# Patient Record
Sex: MALE | Race: WHITE | NOT HISPANIC OR LATINO | Employment: FULL TIME | URBAN - METROPOLITAN AREA
[De-identification: names, ages, dates, MRNs, and addresses within clinical notes are randomized per-mention and may not be internally consistent; named-entity substitution may affect disease eponyms.]

---

## 2023-05-15 DIAGNOSIS — I10 PRIMARY HYPERTENSION: Primary | ICD-10-CM

## 2023-05-15 RX ORDER — ATORVASTATIN CALCIUM 10 MG/1
1 TABLET, FILM COATED ORAL DAILY
COMMUNITY
Start: 2023-05-01

## 2023-05-15 RX ORDER — DUPILUMAB 300 MG/2ML
INJECTION, SOLUTION SUBCUTANEOUS
COMMUNITY
Start: 2023-04-20

## 2023-05-15 RX ORDER — AMLODIPINE BESYLATE 5 MG/1
1 TABLET ORAL DAILY
COMMUNITY
Start: 2023-02-17 | End: 2023-05-15 | Stop reason: SDUPTHER

## 2023-05-15 RX ORDER — AMLODIPINE BESYLATE 5 MG/1
TABLET ORAL
Qty: 90 TABLET | Refills: 0 | Status: SHIPPED | OUTPATIENT
Start: 2023-05-15

## 2023-05-15 NOTE — TELEPHONE ENCOUNTER
Rx Refill Note  Requested Prescriptions     Pending Prescriptions Disp Refills   • amLODIPine (NORVASC) 5 MG tablet [Pharmacy Med Name: AMLODIPINE BESYLATE 5MG TABLETS] 90 tablet      Sig: TAKE 1 TABLET BY MOUTH EVERY DAY      Last office visit with prescribing clinician: 10/20/2022  Last labs: 10/22/2022  (Info found on [facility])     Next office visit with prescribing clinician: Visit date not found       Alma Dykes MA  05/15/23, 13:16 EDT

## 2023-07-28 RX ORDER — ATORVASTATIN CALCIUM 10 MG/1
TABLET, FILM COATED ORAL
Qty: 90 TABLET | Refills: 0 | Status: SHIPPED | OUTPATIENT
Start: 2023-07-28

## 2023-07-28 NOTE — TELEPHONE ENCOUNTER
Rx Refill Note  Requested Prescriptions     Pending Prescriptions Disp Refills    atorvastatin (LIPITOR) 10 MG tablet [Pharmacy Med Name: ATORVASTATIN 10MG TABLETS] 90 tablet 0     Sig: TAKE 1 TABLET BY MOUTH EVERY DAY      Last office visit with prescribing clinician: Visit date not found   Last telemedicine visit with prescribing clinician: Visit date not found   Next office visit with prescribing clinician: 9/8/2023                         Would you like a call back once the refill request has been completed: [] Yes [] No    If the office needs to give you a call back, can they leave a voicemail: [] Yes [] No    Prabha Disla MA  07/28/23, 11:08 EDT

## 2023-08-04 DIAGNOSIS — I10 PRIMARY HYPERTENSION: ICD-10-CM

## 2023-08-04 RX ORDER — AMLODIPINE BESYLATE 5 MG/1
TABLET ORAL
Qty: 90 TABLET | Refills: 0 | Status: SHIPPED | OUTPATIENT
Start: 2023-08-04

## 2023-09-08 ENCOUNTER — OFFICE VISIT (OUTPATIENT)
Dept: FAMILY MEDICINE CLINIC | Facility: CLINIC | Age: 58
End: 2023-09-08
Payer: COMMERCIAL

## 2023-09-08 VITALS
HEART RATE: 83 BPM | BODY MASS INDEX: 29.35 KG/M2 | SYSTOLIC BLOOD PRESSURE: 135 MMHG | HEIGHT: 67 IN | OXYGEN SATURATION: 98 % | WEIGHT: 187 LBS | DIASTOLIC BLOOD PRESSURE: 85 MMHG

## 2023-09-08 DIAGNOSIS — Z12.5 SCREENING PSA (PROSTATE SPECIFIC ANTIGEN): ICD-10-CM

## 2023-09-08 DIAGNOSIS — E66.3 OVERWEIGHT (BMI 25.0-29.9): ICD-10-CM

## 2023-09-08 DIAGNOSIS — D52.0 DIETARY FOLATE DEFICIENCY ANEMIA: ICD-10-CM

## 2023-09-08 DIAGNOSIS — E78.2 MIXED HYPERLIPIDEMIA: ICD-10-CM

## 2023-09-08 DIAGNOSIS — I10 ESSENTIAL HYPERTENSION: Primary | ICD-10-CM

## 2023-09-08 DIAGNOSIS — Z11.59 ENCOUNTER FOR HEPATITIS C SCREENING TEST FOR LOW RISK PATIENT: ICD-10-CM

## 2023-09-08 DIAGNOSIS — E55.9 VITAMIN D DEFICIENCY: ICD-10-CM

## 2023-09-08 DIAGNOSIS — E53.9 VITAMIN B DEFICIENCY: ICD-10-CM

## 2023-09-08 PROBLEM — Z86.010 HISTORY OF COLON POLYPS: Status: ACTIVE | Noted: 2020-07-17

## 2023-09-08 PROBLEM — Z86.0100 HISTORY OF COLON POLYPS: Status: ACTIVE | Noted: 2020-07-17

## 2023-09-08 PROBLEM — Z12.11 ENCOUNTER FOR SCREENING FOR MALIGNANT NEOPLASM OF COLON: Status: ACTIVE | Noted: 2017-05-15

## 2023-09-08 PROBLEM — E53.8 FOLATE DEFICIENCY: Status: ACTIVE | Noted: 2023-09-08

## 2023-09-08 PROBLEM — F10.10 ALCOHOL ABUSE: Status: ACTIVE | Noted: 2022-10-20

## 2023-09-08 NOTE — PROGRESS NOTES
"Subjective     Cruz Lo is a 58 y.o. male who presents with   Chief Complaint   Patient presents with    Hyperlipidemia    Hypertension       Hyperlipidemia    Hypertension       Hypertension on amlodipine and hyperlipidemia on atorvastatin here for follow up.  No issues.   Weaning off alcohol drinks once a week or less.  Still uses some tobacco.  Has the usual stressors of everyday life. Trying to live his life better.   Last visit had folate deficiency anemia.      The following data was reviewed by: Jeannine Wong MD on 09/08/2023:    Data reviewed : labs from last visits      Review of Systems     Objective     /85   Pulse 83   Ht 168.9 cm (66.5\")   Wt 84.8 kg (187 lb)   SpO2 98%   BMI 29.73 kg/m²     Physical Exam  Constitutional:       Appearance: Normal appearance.   Cardiovascular:      Rate and Rhythm: Normal rate and regular rhythm.      Heart sounds: Normal heart sounds.   Pulmonary:      Effort: Pulmonary effort is normal.      Breath sounds: Normal breath sounds.   Neurological:      Mental Status: He is alert.   Psychiatric:         Behavior: Behavior normal.       Procedures     Assessment & Plan   Diagnoses and all orders for this visit:    1. Essential hypertension (Primary)    2. Mixed hyperlipidemia    3. Screening PSA (prostate specific antigen)    4. Encounter for hepatitis C screening test for low risk patient    5. Dietary folate deficiency anemia    6. Vitamin B deficiency    7. Vitamin D deficiency    8. Overweight (BMI 25.0-29.9)  Assessment & Plan:  Patient's (Body mass index is 29.73 kg/m².) indicates that they are overweight with health conditions that include hypertension . Weight is improving with lifestyle modifications. BMI is is above average; BMI management plan is completed. We discussed  reducing alcohol .            Discussion    Patient Instructions   I have ordered lab tests today.  You should receive a phone call or a King[employer] message with those " results.  If you have not heard from us in 7-10 days, please call the office.      Follow through with your desire to try acupuncture but we also discussed trying daily Naltrexone to try to help with the alcohol use.  Just let me know.               Jeannine Wong MD

## 2023-09-08 NOTE — ASSESSMENT & PLAN NOTE
Patient's (Body mass index is 29.73 kg/m².) indicates that they are overweight with health conditions that include hypertension . Weight is improving with lifestyle modifications. BMI is is above average; BMI management plan is completed. We discussed  reducing alcohol .

## 2023-09-08 NOTE — PATIENT INSTRUCTIONS
I have ordered lab tests today.  You should receive a phone call or a Ac[employer]t message with those results.  If you have not heard from us in 7-10 days, please call the office.      Follow through with your desire to try acupuncture but we also discussed trying daily Naltrexone to try to help with the alcohol use.  Just let me know.

## 2023-09-09 LAB
25(OH)D3+25(OH)D2 SERPL-MCNC: 38.3 NG/ML (ref 30–100)
ALBUMIN SERPL-MCNC: 4.3 G/DL (ref 3.8–4.9)
ALBUMIN/GLOB SERPL: 1.9 {RATIO} (ref 1.2–2.2)
ALP SERPL-CCNC: 56 IU/L (ref 44–121)
ALT SERPL-CCNC: 17 IU/L (ref 0–44)
AST SERPL-CCNC: 20 IU/L (ref 0–40)
BASOPHILS # BLD AUTO: 0.1 X10E3/UL (ref 0–0.2)
BASOPHILS NFR BLD AUTO: 1 %
BILIRUB SERPL-MCNC: <0.2 MG/DL (ref 0–1.2)
BUN SERPL-MCNC: 17 MG/DL (ref 6–24)
BUN/CREAT SERPL: 12 (ref 9–20)
CALCIUM SERPL-MCNC: 9.3 MG/DL (ref 8.7–10.2)
CHLORIDE SERPL-SCNC: 109 MMOL/L (ref 96–106)
CHOLEST SERPL-MCNC: 161 MG/DL (ref 100–199)
CO2 SERPL-SCNC: 21 MMOL/L (ref 20–29)
CREAT SERPL-MCNC: 1.45 MG/DL (ref 0.76–1.27)
EGFRCR SERPLBLD CKD-EPI 2021: 56 ML/MIN/1.73
EOSINOPHIL # BLD AUTO: 0.1 X10E3/UL (ref 0–0.4)
EOSINOPHIL NFR BLD AUTO: 2 %
ERYTHROCYTE [DISTWIDTH] IN BLOOD BY AUTOMATED COUNT: 13.1 % (ref 11.6–15.4)
FOLATE SERPL-MCNC: 13.2 NG/ML
GLOBULIN SER CALC-MCNC: 2.3 G/DL (ref 1.5–4.5)
GLUCOSE SERPL-MCNC: 93 MG/DL (ref 70–99)
HCT VFR BLD AUTO: 34.4 % (ref 37.5–51)
HCV IGG SERPL QL IA: NON REACTIVE
HDLC SERPL-MCNC: 65 MG/DL
HGB BLD-MCNC: 11.8 G/DL (ref 13–17.7)
IMM GRANULOCYTES # BLD AUTO: 0 X10E3/UL (ref 0–0.1)
IMM GRANULOCYTES NFR BLD AUTO: 0 %
LDLC SERPL CALC-MCNC: 83 MG/DL (ref 0–99)
LYMPHOCYTES # BLD AUTO: 1.5 X10E3/UL (ref 0.7–3.1)
LYMPHOCYTES NFR BLD AUTO: 21 %
MCH RBC QN AUTO: 31.4 PG (ref 26.6–33)
MCHC RBC AUTO-ENTMCNC: 34.3 G/DL (ref 31.5–35.7)
MCV RBC AUTO: 92 FL (ref 79–97)
MONOCYTES # BLD AUTO: 0.6 X10E3/UL (ref 0.1–0.9)
MONOCYTES NFR BLD AUTO: 9 %
NEUTROPHILS # BLD AUTO: 4.6 X10E3/UL (ref 1.4–7)
NEUTROPHILS NFR BLD AUTO: 67 %
PLATELET # BLD AUTO: 258 X10E3/UL (ref 150–450)
POTASSIUM SERPL-SCNC: 4.8 MMOL/L (ref 3.5–5.2)
PROT SERPL-MCNC: 6.6 G/DL (ref 6–8.5)
RBC # BLD AUTO: 3.76 X10E6/UL (ref 4.14–5.8)
SODIUM SERPL-SCNC: 146 MMOL/L (ref 134–144)
TRIGL SERPL-MCNC: 66 MG/DL (ref 0–149)
VIT B12 SERPL-MCNC: 1010 PG/ML (ref 232–1245)
VLDLC SERPL CALC-MCNC: 13 MG/DL (ref 5–40)
WBC # BLD AUTO: 6.9 X10E3/UL (ref 3.4–10.8)

## 2023-10-25 DIAGNOSIS — I10 PRIMARY HYPERTENSION: ICD-10-CM

## 2023-10-25 RX ORDER — ATORVASTATIN CALCIUM 10 MG/1
TABLET, FILM COATED ORAL
Qty: 90 TABLET | Refills: 0 | Status: SHIPPED | OUTPATIENT
Start: 2023-10-25

## 2023-10-25 RX ORDER — AMLODIPINE BESYLATE 5 MG/1
TABLET ORAL
Qty: 90 TABLET | Refills: 0 | Status: SHIPPED | OUTPATIENT
Start: 2023-10-25

## 2024-01-08 ENCOUNTER — TELEPHONE (OUTPATIENT)
Dept: FAMILY MEDICINE CLINIC | Facility: CLINIC | Age: 59
End: 2024-01-08
Payer: COMMERCIAL

## 2024-01-08 NOTE — TELEPHONE ENCOUNTER
Pt said he'll try the topical 1st since he can't do oral medications. Please send to [place] in chart

## 2024-01-08 NOTE — TELEPHONE ENCOUNTER
There are 2 options for treating toenail fungus.  1.  There is an oral medication To take for 3 months and then the toenails grow out over the next 3 to 6 months.  2. There is a topical treatment that she have to put on every day and it can take up to a year.  Does he have a preference?

## 2024-01-08 NOTE — TELEPHONE ENCOUNTER
Caller: Cruz Lo    Relationship: Self    Best call back number: [phone]     What medication are you requesting:      What are your current symptoms: TOE NAIL FUNGUS ON LEFT FOOT, ONE TOE HAS IT AND RIGHT FOOT HAS ONE TOE STARTING    How long have you been experiencing symptoms:      Have you had these symptoms before:    [] Yes  [] No    Have you been treated for these symptoms before:   [] Yes  [] No    If a prescription is needed, what is your preferred pharmacy and phone number: [id number] [address] AT [facility] - [phone]  - [phone]      Additional notes: PATIENT CALLED AND WANTS TO KNOW IF DR CORTEZ CAN CALL SOMETHING IN FOR TOE NAIL FUNGUS.  HE WENT TO [facility] FOR OVER THE COUNTER MEDICATION BUT NONE OF THEM TREAT THE TOE NAIL FUNGUS.

## 2024-01-23 DIAGNOSIS — I10 PRIMARY HYPERTENSION: ICD-10-CM

## 2024-01-23 RX ORDER — AMLODIPINE BESYLATE 5 MG/1
TABLET ORAL
Qty: 90 TABLET | Refills: 0 | Status: SHIPPED | OUTPATIENT
Start: 2024-01-23

## 2024-01-23 RX ORDER — ATORVASTATIN CALCIUM 10 MG/1
TABLET, FILM COATED ORAL
Qty: 90 TABLET | Refills: 0 | Status: SHIPPED | OUTPATIENT
Start: 2024-01-23

## 2024-03-08 ENCOUNTER — OFFICE VISIT (OUTPATIENT)
Dept: FAMILY MEDICINE CLINIC | Facility: CLINIC | Age: 59
End: 2024-03-08
Payer: COMMERCIAL

## 2024-03-08 VITALS
OXYGEN SATURATION: 98 % | BODY MASS INDEX: 29.29 KG/M2 | RESPIRATION RATE: 16 BRPM | DIASTOLIC BLOOD PRESSURE: 72 MMHG | HEIGHT: 67 IN | HEART RATE: 70 BPM | SYSTOLIC BLOOD PRESSURE: 138 MMHG | WEIGHT: 186.6 LBS

## 2024-03-08 DIAGNOSIS — D64.9 MILD ANEMIA: ICD-10-CM

## 2024-03-08 DIAGNOSIS — R35.0 URINARY FREQUENCY: ICD-10-CM

## 2024-03-08 DIAGNOSIS — D52.0 DIETARY FOLATE DEFICIENCY ANEMIA: ICD-10-CM

## 2024-03-08 DIAGNOSIS — N28.9 RENAL INSUFFICIENCY: ICD-10-CM

## 2024-03-08 DIAGNOSIS — M79.642 PAIN IN BOTH HANDS: ICD-10-CM

## 2024-03-08 DIAGNOSIS — I10 ESSENTIAL HYPERTENSION: Primary | ICD-10-CM

## 2024-03-08 DIAGNOSIS — Z12.5 SCREENING PSA (PROSTATE SPECIFIC ANTIGEN): ICD-10-CM

## 2024-03-08 DIAGNOSIS — E55.9 VITAMIN D DEFICIENCY: ICD-10-CM

## 2024-03-08 DIAGNOSIS — M79.641 PAIN IN BOTH HANDS: ICD-10-CM

## 2024-03-08 DIAGNOSIS — E78.2 MIXED HYPERLIPIDEMIA: ICD-10-CM

## 2024-03-08 DIAGNOSIS — R53.83 FATIGUE, UNSPECIFIED TYPE: ICD-10-CM

## 2024-03-08 DIAGNOSIS — L20.84 INTRINSIC ECZEMA: ICD-10-CM

## 2024-03-08 DIAGNOSIS — E53.9 VITAMIN B DEFICIENCY: ICD-10-CM

## 2024-03-08 PROBLEM — M25.511 PAIN OF BOTH SHOULDER JOINTS: Status: ACTIVE | Noted: 2024-03-08

## 2024-03-08 PROBLEM — M25.512 PAIN OF BOTH SHOULDER JOINTS: Status: ACTIVE | Noted: 2024-03-08

## 2024-03-08 PROCEDURE — 99214 OFFICE O/P EST MOD 30 MIN: CPT | Performed by: FAMILY MEDICINE

## 2024-03-08 NOTE — PATIENT INSTRUCTIONS
I have ordered lab tests today.  You should receive a phone call or a [employer]ine [employer]t message with those results.  If you have not heard from us in 7-10 days, please call the office.      Using advil a few times a month is reasonable.    We could get a hand specialist if we need it.     Continue B12 and D since those are helpful.  Use the sublingual. We can consider shots if your levels are not at goal.     Regular exercise is helpful for energy.

## 2024-03-08 NOTE — PROGRESS NOTES
"Subjective     Cruz Lo is a 59 y.o. male who presents with   Chief Complaint   Patient presents with    Hypertension    Hyperlipidemia       Hypertension    Hyperlipidemia         Hypertension on amlodipine 5mg.  He doesn't monitor it.  He's had more stress than usual lately and didn't get to take a winter break.   He's on atorvastatin for cholesterol.     He recently turned 59.  He felt \"wiped out\" for a couple weeks.  Vision seemed blurred with driving in the dark. He started back on B12 and D3 and that seemed to help.  He doesn't eat great but tries.  He does take a multivitamin half the time.  Drinks Ensure at times.  He stopped alcohol in November.  He does wake twice a night to void.  He feels achy sometimes in the morning.  Feels it in his bones, not muscles and his hands hurt.   He takes Advil on bad days and that helps.  His hands and shoulders will ache.              Review of Systems     Objective     /72   Pulse 70   Resp 16   Ht 168.9 cm (66.5\")   Wt 84.6 kg (186 lb 9.6 oz)   SpO2 98%   BMI 29.67 kg/m²     Physical Exam  Constitutional:       Appearance: Normal appearance.   Cardiovascular:      Rate and Rhythm: Normal rate and regular rhythm.      Heart sounds: Normal heart sounds.   Pulmonary:      Effort: Pulmonary effort is normal.      Breath sounds: Normal breath sounds.   Neurological:      Mental Status: He is alert.   Psychiatric:         Behavior: Behavior normal.         Procedures     Assessment & Plan   Diagnoses and all orders for this visit:    1. Essential hypertension (Primary)  Assessment & Plan:  Controlled amlodipine.       2. Mixed hyperlipidemia  -     Comprehensive Metabolic Panel  -     Lipid Panel    3. Fatigue, unspecified type  -     TSH    4. Vitamin B deficiency  -     Vitamin B12    5. Vitamin D deficiency  -     Vitamin D,25-Hydroxy    6. Dietary folate deficiency anemia  -     CBC & Differential  -     Folate    7. Urinary frequency    8. Screening PSA " (prostate specific antigen)  -     PSA Screen    9. Pain in both hands  -     C-reactive Protein  -     Cyclic Citrul Peptide Antibody, IgG / IgA  -     Rheumatoid Factor  -     Sedimentation Rate  -     CK    10. Intrinsic eczema  Assessment & Plan:  On Dupixent with help           Discussion    Patient Instructions   I have ordered lab tests today.  You should receive a phone call or a App[employer]ud message with those results.  If you have not heard from us in 7-10 days, please call the office.      Using advil a few times a month is reasonable.    We could get a hand specialist if we need it.     Continue B12 and D since those are helpful.  Use the sublingual. We can consider shots if your levels are not at goal.     Regular exercise is helpful for energy.               Jeannine Wong MD

## 2024-03-09 LAB
25(OH)D3+25(OH)D2 SERPL-MCNC: 29 NG/ML (ref 30–100)
ALBUMIN SERPL-MCNC: 4.4 G/DL (ref 3.5–5.2)
ALBUMIN/GLOB SERPL: 2.2 G/DL
ALP SERPL-CCNC: 54 U/L (ref 39–117)
ALT SERPL-CCNC: 22 U/L (ref 1–41)
AST SERPL-CCNC: 18 U/L (ref 1–40)
BASOPHILS # BLD AUTO: 0.05 10*3/MM3 (ref 0–0.2)
BASOPHILS NFR BLD AUTO: 0.7 % (ref 0–1.5)
BILIRUB SERPL-MCNC: 0.4 MG/DL (ref 0–1.2)
BUN SERPL-MCNC: 22 MG/DL (ref 8–23)
BUN/CREAT SERPL: 14.2 (ref 7–25)
CALCIUM SERPL-MCNC: 9.6 MG/DL (ref 8.6–10.5)
CCP IGA+IGG SERPL IA-ACNC: 3 UNITS (ref 0–19)
CHLORIDE SERPL-SCNC: 110 MMOL/L (ref 98–107)
CHOLEST SERPL-MCNC: 169 MG/DL (ref 0–200)
CK SERPL-CCNC: 92 U/L (ref 20–200)
CO2 SERPL-SCNC: 23.1 MMOL/L (ref 22–29)
CREAT SERPL-MCNC: 1.55 MG/DL (ref 0.76–1.27)
CRP SERPL-MCNC: <0.3 MG/DL (ref 0–0.5)
EGFRCR SERPLBLD CKD-EPI 2021: 50.9 ML/MIN/1.73
EOSINOPHIL # BLD AUTO: 0.07 10*3/MM3 (ref 0–0.4)
EOSINOPHIL NFR BLD AUTO: 0.9 % (ref 0.3–6.2)
ERYTHROCYTE [DISTWIDTH] IN BLOOD BY AUTOMATED COUNT: 13 % (ref 12.3–15.4)
ERYTHROCYTE [SEDIMENTATION RATE] IN BLOOD BY WESTERGREN METHOD: 7 MM/HR (ref 0–20)
FOLATE SERPL-MCNC: >20 NG/ML (ref 4.78–24.2)
GLOBULIN SER CALC-MCNC: 2 GM/DL
GLUCOSE SERPL-MCNC: 86 MG/DL (ref 65–99)
HCT VFR BLD AUTO: 34.5 % (ref 37.5–51)
HDLC SERPL-MCNC: 62 MG/DL (ref 40–60)
HGB BLD-MCNC: 11.3 G/DL (ref 13–17.7)
IMM GRANULOCYTES # BLD AUTO: 0.04 10*3/MM3 (ref 0–0.05)
IMM GRANULOCYTES NFR BLD AUTO: 0.5 % (ref 0–0.5)
LDLC SERPL CALC-MCNC: 86 MG/DL (ref 0–100)
LYMPHOCYTES # BLD AUTO: 1.32 10*3/MM3 (ref 0.7–3.1)
LYMPHOCYTES NFR BLD AUTO: 17.8 % (ref 19.6–45.3)
MCH RBC QN AUTO: 30.4 PG (ref 26.6–33)
MCHC RBC AUTO-ENTMCNC: 32.8 G/DL (ref 31.5–35.7)
MCV RBC AUTO: 92.7 FL (ref 79–97)
MONOCYTES # BLD AUTO: 0.61 10*3/MM3 (ref 0.1–0.9)
MONOCYTES NFR BLD AUTO: 8.2 % (ref 5–12)
NEUTROPHILS # BLD AUTO: 5.33 10*3/MM3 (ref 1.7–7)
NEUTROPHILS NFR BLD AUTO: 71.9 % (ref 42.7–76)
NRBC BLD AUTO-RTO: 0 /100 WBC (ref 0–0.2)
PLATELET # BLD AUTO: 250 10*3/MM3 (ref 140–450)
POTASSIUM SERPL-SCNC: 5.5 MMOL/L (ref 3.5–5.2)
PROT SERPL-MCNC: 6.4 G/DL (ref 6–8.5)
PSA SERPL-MCNC: 3.3 NG/ML (ref 0–4)
RBC # BLD AUTO: 3.72 10*6/MM3 (ref 4.14–5.8)
RHEUMATOID FACT SERPL-ACNC: <10 IU/ML
SODIUM SERPL-SCNC: 144 MMOL/L (ref 136–145)
TRIGL SERPL-MCNC: 118 MG/DL (ref 0–150)
TSH SERPL DL<=0.005 MIU/L-ACNC: 3.35 UIU/ML (ref 0.27–4.2)
VIT B12 SERPL-MCNC: 854 PG/ML (ref 211–946)
VLDLC SERPL CALC-MCNC: 21 MG/DL (ref 5–40)
WBC # BLD AUTO: 7.42 10*3/MM3 (ref 3.4–10.8)

## 2024-03-11 ENCOUNTER — TELEPHONE (OUTPATIENT)
Dept: FAMILY MEDICINE CLINIC | Facility: CLINIC | Age: 59
End: 2024-03-11
Payer: COMMERCIAL

## 2024-03-11 LAB
IRON SERPL-MCNC: 94 MCG/DL (ref 59–158)
WRITTEN AUTHORIZATION: NORMAL

## 2024-03-11 NOTE — TELEPHONE ENCOUNTER
Caller: Cruz Lo    Relationship: Self    Best call back number: [phone]     What test was performed: BLOOD WORK    When was the test performed: 03-    Additional notes: PATIENT STATED HE HAS RECEIVED HIS LAB RESULTS IN [facility].    PATIENT IS REQUESTING TO KNOW EXACTLY WHAT SUPPLEMENTS AND HOW MUCH OF THESE SUPPLEMENTS HE IS TO BE TAKING.    PLEASE CALL TO DISCUSS AND ADVISE.

## 2024-03-21 ENCOUNTER — HOSPITAL ENCOUNTER (OUTPATIENT)
Dept: ULTRASOUND IMAGING | Facility: HOSPITAL | Age: 59
Discharge: HOME | End: 2024-03-21
Admitting: FAMILY MEDICINE
Payer: COMMERCIAL

## 2024-03-21 DIAGNOSIS — N28.9 RENAL INSUFFICIENCY: ICD-10-CM

## 2024-03-21 PROCEDURE — 76775 US EXAM ABDO BACK WALL LIM: CPT

## 2024-03-26 DIAGNOSIS — R93.429 ABNORMAL RENAL ULTRASOUND: Primary | ICD-10-CM

## 2024-03-26 DIAGNOSIS — N28.9 RENAL INSUFFICIENCY: ICD-10-CM

## 2024-04-10 DIAGNOSIS — I10 PRIMARY HYPERTENSION: ICD-10-CM

## 2024-04-10 RX ORDER — AMLODIPINE BESYLATE 5 MG/1
5 TABLET ORAL DAILY
Qty: 90 TABLET | Refills: 1 | Status: SHIPPED | OUTPATIENT
Start: 2024-04-10

## 2024-04-10 RX ORDER — ATORVASTATIN CALCIUM 10 MG/1
10 TABLET, FILM COATED ORAL DAILY
Qty: 90 TABLET | Refills: 1 | Status: SHIPPED | OUTPATIENT
Start: 2024-04-10

## 2024-04-10 NOTE — TELEPHONE ENCOUNTER
Rx Refill Note  Requested Prescriptions     Pending Prescriptions Disp Refills    amLODIPine (NORVASC) 5 MG tablet 90 tablet 0     Sig: Take 1 tablet by mouth Daily.    atorvastatin (LIPITOR) 10 MG tablet 90 tablet 0     Sig: Take 1 tablet by mouth Daily.      Last office visit with prescribing clinician: 3/8/2024   Next office visit with prescribing clinician: 9/13/2024     Andry Brooks CMA  04/10/24, 13:12 EDT

## 2024-04-12 ENCOUNTER — TELEPHONE (OUTPATIENT)
Dept: FAMILY MEDICINE CLINIC | Facility: CLINIC | Age: 59
End: 2024-04-12
Payer: COMMERCIAL

## 2024-04-12 RX ORDER — LOSARTAN POTASSIUM 100 MG/1
100 TABLET ORAL DAILY
Qty: 90 TABLET | Refills: 1 | Status: SHIPPED | OUTPATIENT
Start: 2024-04-12 | End: 2024-04-15 | Stop reason: SDUPTHER

## 2024-04-12 NOTE — TELEPHONE ENCOUNTER
Caller: Cruz Lo    Relationship: Self    Best call back number: [phone] (Mobile)     What is the best time to reach you: ANYTIME, ASAP    Who are you requesting to speak with (clinical staff, provider,  specific staff member): CLINICAL STAFF/ DR CORTEZ    Do you know the name of the person who called: Cruz Lo    What was the call regarding: PATIENT STATES HIS BLOOD PRESSURE HAS NEVER GONE DOWN EVEN WITH THE LIFESTYLE CHANGES AND THE BLOOD PRESSURE MEDICATION HE HAS BEEN PRESCRIBED AND HIS BLOOD PRESSURE TODAY /88 AND STAYS AROUND THAT TUTU PRETTY CONSISTENTLY    PATIENT IS ASKING WHAT DR CORTEZ THINKS HIS NEXT STEPS SHOULD BE TO GET HIS BLOOD PRESSURE UNDER CONTROL    PLEASE ADVISE PATIENT REGARDING THIS ASAP    Is it okay if the provider responds through [employer]: PLEASE CALL PATIENT REGARDING THIS ASAP

## 2024-04-15 ENCOUNTER — TELEPHONE (OUTPATIENT)
Dept: FAMILY MEDICINE CLINIC | Facility: CLINIC | Age: 59
End: 2024-04-15
Payer: COMMERCIAL

## 2024-04-15 RX ORDER — LOSARTAN POTASSIUM 100 MG/1
100 TABLET ORAL DAILY
Qty: 30 TABLET | Refills: 1 | Status: SHIPPED | OUTPATIENT
Start: 2024-04-15

## 2024-04-15 NOTE — TELEPHONE ENCOUNTER
Pt says his losartan will not come in for another week. [facility].

## 2024-04-22 DIAGNOSIS — I10 PRIMARY HYPERTENSION: ICD-10-CM

## 2024-04-22 RX ORDER — AMLODIPINE BESYLATE 5 MG/1
5 TABLET ORAL DAILY
Qty: 90 TABLET | Refills: 1 | Status: SHIPPED | OUTPATIENT
Start: 2024-04-22

## 2024-04-22 RX ORDER — ATORVASTATIN CALCIUM 10 MG/1
10 TABLET, FILM COATED ORAL DAILY
Qty: 90 TABLET | Refills: 1 | Status: SHIPPED | OUTPATIENT
Start: 2024-04-22

## 2024-05-29 RX ORDER — LOSARTAN POTASSIUM 100 MG/1
100 TABLET ORAL DAILY
Qty: 90 TABLET | Refills: 1 | Status: SHIPPED | OUTPATIENT
Start: 2024-05-29

## 2024-05-29 NOTE — TELEPHONE ENCOUNTER
Rx Refill Note  Requested Prescriptions     Pending Prescriptions Disp Refills    losartan (Cozaar) 100 MG tablet 90 tablet 1     Sig: Take 1 tablet by mouth Daily.      Last office visit with prescribing clinician: 3/8/2024   Next office visit with prescribing clinician: 9/13/2024     Andry Brooks CMA  05/29/24, 09:13 EDT

## 2024-06-06 NOTE — PROGRESS NOTES
Follow-up (D64.9 Mild Anemia)        REASON FOR CONSULTATION:  anemia                           REQUESTING PHYSICIAN: Jeannine Wong*  RECORDS OBTAINED:  Records of the patients history including those from the electronic medical record were reviewed and summarized in detail.    HISTORY OF PRESENT ILLNESS:  The patient is a 59 y.o. year old male with alcohol abuse, hypertension, hyperlipidemia, CKD, referred to our clinic for evaluation of anemia.  Upon lab review, hemoglobin has been ranging between 11.3 and 11.8 since September 2023.  He reports feeling fatigued.  He also complains of loss of appetite, however has not lost any weight.  He reports he has bilateral shoulder pains at night.  Has to urinate more often at night.  Denies any shortness of breath palpitation or chest pain.  Denies any pica.  No prior history of blood transfusion      Past Medical History:   Diagnosis Date    Alcohol abuse 10/20/2022    Cough 10/20/2022    Essential hypertension 07/29/2022    History of colon polyps     History of COVID-19 10/20/2022    Mixed hyperlipidemia 10/20/2022    Other fatigue 07/29/2022    Vitamin B deficiency 07/29/2022    Vitamin D deficiency 07/29/2022     Past Surgical History:   Procedure Laterality Date    BACK SURGERY      COLONOSCOPY  07/2020    WRIST FUSION         MEDICATIONS    Current Outpatient Medications:     atorvastatin (LIPITOR) 10 MG tablet, TAKE 1 TABLET BY MOUTH EVERY DAY, Disp: 90 tablet, Rfl: 1    B Complex Vitamins (VITAMIN B COMPLEX PO), Take  by mouth As Needed., Disp: , Rfl:     ciclopirox (Penlac) 8 % solution, Apply  topically to the appropriate area as directed Every Night. And remove with rubbing alcohol once a week., Disp: 6 mL, Rfl: 6    Dupixent 300 MG/2ML solution pen-injector, , Disp: , Rfl:     losartan (Cozaar) 100 MG tablet, Take 1 tablet by mouth Daily., Disp: 90 tablet, Rfl: 1    amLODIPine (NORVASC) 5 MG tablet, TAKE 1 TABLET BY MOUTH EVERY DAY (Patient not  "taking: Reported on 6/10/2024), Disp: 90 tablet, Rfl: 1    Cholecalciferol 75 MCG (3000 UT) tablet, Take  by mouth., Disp: , Rfl:     ALLERGIES:   No Known Allergies    SOCIAL HISTORY:       Social History     Socioeconomic History    Marital status:      Spouse name: Nathaly    Number of children: 3   Tobacco Use    Smoking status: Never    Smokeless tobacco: Never   Vaping Use    Vaping status: Never Used   Substance and Sexual Activity    Alcohol use: Yes     Comment: heavily    Drug use: Defer    Sexual activity: Yes         FAMILY HISTORY:  No family history on file.    REVIEW OF SYSTEMS:  Review of Systems   Constitutional:  Positive for activity change and fatigue. Negative for unexpected weight change.   Respiratory:  Negative for chest tightness and shortness of breath.    Cardiovascular:  Negative for palpitations and leg swelling.   Gastrointestinal:  Negative for anal bleeding and blood in stool.   Genitourinary:  Positive for enuresis. Negative for hematuria.   Musculoskeletal:         Bilateral shoulder pain at night   Hematological: Negative.               Vitals:    06/10/24 1237   BP: 154/79   Pulse: 65   Resp: 16   Temp: 98 °F (36.7 °C)   TempSrc: Oral   SpO2: 98%   Weight: 84.4 kg (186 lb 1.6 oz)   Height: 168.9 cm (66.5\")   PainSc: 0-No pain         6/10/2024    12:40 PM   Current Status   ECOG score 0        PHYSICAL EXAM:    CONSTITUTIONAL:  Vital signs reviewed.  No distress, looks comfortable.  RESPIRATORY:  Normal respiratory effort.  Lungs clear to auscultation bilaterally.  CARDIOVASCULAR:  Normal S1, S2.  No murmurs rubs or gallops.  No significant lower extremity edema.  GASTROINTESTINAL: Abdomen appears unremarkable.  Nontender.   NEURO:  No focal deficits.  Appears to have equal strength all 4 extremities.  SKIN:  Warm.  No rashes.  PSYCHIATRIC:  Normal judgment and insight.  Normal mood and affect.     RECENT LABS:        WBC   Date Value Ref Range Status   06/10/2024 6.91 3.40 " - 10.80 10*3/mm3 Final   03/08/2024 7.42 3.40 - 10.80 10*3/mm3 Final   09/08/2023 6.9 3.4 - 10.8 x10E3/uL Final     Hemoglobin   Date Value Ref Range Status   06/10/2024 10.9 (L) 13.0 - 17.7 g/dL Final   03/08/2024 11.3 (L) 13.0 - 17.7 g/dL Final   09/08/2023 11.8 (L) 13.0 - 17.7 g/dL Final     Platelets   Date Value Ref Range Status   06/10/2024 195 140 - 450 10*3/mm3 Final   03/08/2024 250 140 - 450 10*3/mm3 Final   09/08/2023 258 150 - 450 x10E3/uL Final     Assessment:  *Normocytic anemia  *CKD  *Fatigue  *Loss of appetite, without any change in weight  - Upon lab review, his hemoglobin has been ranging from 11.3-11.8 since September 2023.  B12 and folate unremarkable.  -Denies any active bleeding    Plan  -Will obtain iron studies, ferritin, B12, folate, NINO, LDH, haptoglobin, reticulocyte count, testosterone, SIEP, SPEP, erythropoietin for further evaluation.  Will call with results    Return to clinic in 3 months with CBC

## 2024-06-10 ENCOUNTER — LAB (OUTPATIENT)
Dept: LAB | Facility: HOSPITAL | Age: 59
End: 2024-06-10
Payer: COMMERCIAL

## 2024-06-10 ENCOUNTER — CONSULT (OUTPATIENT)
Dept: ONCOLOGY | Facility: CLINIC | Age: 59
End: 2024-06-10
Payer: COMMERCIAL

## 2024-06-10 VITALS
OXYGEN SATURATION: 98 % | HEART RATE: 65 BPM | WEIGHT: 186.1 LBS | BODY MASS INDEX: 29.91 KG/M2 | HEIGHT: 66 IN | SYSTOLIC BLOOD PRESSURE: 154 MMHG | RESPIRATION RATE: 16 BRPM | TEMPERATURE: 98 F | DIASTOLIC BLOOD PRESSURE: 79 MMHG

## 2024-06-10 DIAGNOSIS — R63.0 LOSS OF APPETITE: ICD-10-CM

## 2024-06-10 DIAGNOSIS — D64.9 MILD ANEMIA: Primary | ICD-10-CM

## 2024-06-10 DIAGNOSIS — R53.83 OTHER FATIGUE: ICD-10-CM

## 2024-06-10 DIAGNOSIS — D64.9 NORMOCYTIC ANEMIA: Primary | ICD-10-CM

## 2024-06-10 DIAGNOSIS — D64.9 NORMOCYTIC ANEMIA: ICD-10-CM

## 2024-06-10 LAB
BASOPHILS # BLD AUTO: 0.04 10*3/MM3 (ref 0–0.2)
BASOPHILS NFR BLD AUTO: 0.6 % (ref 0–1.5)
DAT POLY-SP REAG RBC QL: NEGATIVE
DEPRECATED RDW RBC AUTO: 43.6 FL (ref 37–54)
EOSINOPHIL # BLD AUTO: 0.03 10*3/MM3 (ref 0–0.4)
EOSINOPHIL NFR BLD AUTO: 0.4 % (ref 0.3–6.2)
ERYTHROCYTE [DISTWIDTH] IN BLOOD BY AUTOMATED COUNT: 12.8 % (ref 12.3–15.4)
FERRITIN SERPL-MCNC: 257 NG/ML (ref 30–400)
FOLATE SERPL-MCNC: >20 NG/ML (ref 4.78–24.2)
HAPTOGLOB SERPL-MCNC: 194 MG/DL (ref 30–200)
HCT VFR BLD AUTO: 33 % (ref 37.5–51)
HGB BLD-MCNC: 10.9 G/DL (ref 13–17.7)
HGB RETIC QN AUTO: 34.6 PG (ref 29.8–36.1)
IMM GRANULOCYTES # BLD AUTO: 0.05 10*3/MM3 (ref 0–0.05)
IMM GRANULOCYTES NFR BLD AUTO: 0.7 % (ref 0–0.5)
IMM RETICS NFR: 13.1 % (ref 3–15.8)
IRON 24H UR-MRATE: 116 MCG/DL (ref 59–158)
IRON SATN MFR SERPL: 34 % (ref 20–50)
LDH SERPL-CCNC: 201 U/L (ref 135–225)
LYMPHOCYTES # BLD AUTO: 1.22 10*3/MM3 (ref 0.7–3.1)
LYMPHOCYTES NFR BLD AUTO: 17.7 % (ref 19.6–45.3)
MCH RBC QN AUTO: 30.6 PG (ref 26.6–33)
MCHC RBC AUTO-ENTMCNC: 33 G/DL (ref 31.5–35.7)
MCV RBC AUTO: 92.7 FL (ref 79–97)
MONOCYTES # BLD AUTO: 0.57 10*3/MM3 (ref 0.1–0.9)
MONOCYTES NFR BLD AUTO: 8.2 % (ref 5–12)
NEUTROPHILS NFR BLD AUTO: 5 10*3/MM3 (ref 1.7–7)
NEUTROPHILS NFR BLD AUTO: 72.4 % (ref 42.7–76)
NRBC BLD AUTO-RTO: 0 /100 WBC (ref 0–0.2)
PLATELET # BLD AUTO: 195 10*3/MM3 (ref 140–450)
PMV BLD AUTO: 11.3 FL (ref 6–12)
RBC # BLD AUTO: 3.56 10*6/MM3 (ref 4.14–5.8)
RETICS # AUTO: 0.06 10*6/MM3 (ref 0.02–0.13)
RETICS/RBC NFR AUTO: 1.73 % (ref 0.7–1.9)
TESTOST SERPL-MCNC: 404 NG/DL (ref 193–740)
TIBC SERPL-MCNC: 346 MCG/DL (ref 298–536)
TRANSFERRIN SERPL-MCNC: 232 MG/DL (ref 200–360)
VIT B12 BLD-MCNC: 727 PG/ML (ref 211–946)
WBC NRBC COR # BLD AUTO: 6.91 10*3/MM3 (ref 3.4–10.8)

## 2024-06-10 PROCEDURE — 83615 LACTATE (LD) (LDH) ENZYME: CPT

## 2024-06-10 PROCEDURE — 85025 COMPLETE CBC W/AUTO DIFF WBC: CPT

## 2024-06-10 PROCEDURE — 99203 OFFICE O/P NEW LOW 30 MIN: CPT | Performed by: STUDENT IN AN ORGANIZED HEALTH CARE EDUCATION/TRAINING PROGRAM

## 2024-06-10 PROCEDURE — 84466 ASSAY OF TRANSFERRIN: CPT | Performed by: STUDENT IN AN ORGANIZED HEALTH CARE EDUCATION/TRAINING PROGRAM

## 2024-06-10 PROCEDURE — 83010 ASSAY OF HAPTOGLOBIN QUANT: CPT | Performed by: STUDENT IN AN ORGANIZED HEALTH CARE EDUCATION/TRAINING PROGRAM

## 2024-06-10 PROCEDURE — 82746 ASSAY OF FOLIC ACID SERUM: CPT | Performed by: STUDENT IN AN ORGANIZED HEALTH CARE EDUCATION/TRAINING PROGRAM

## 2024-06-10 PROCEDURE — 82607 VITAMIN B-12: CPT | Performed by: STUDENT IN AN ORGANIZED HEALTH CARE EDUCATION/TRAINING PROGRAM

## 2024-06-10 PROCEDURE — 82728 ASSAY OF FERRITIN: CPT | Performed by: STUDENT IN AN ORGANIZED HEALTH CARE EDUCATION/TRAINING PROGRAM

## 2024-06-10 PROCEDURE — 36415 COLL VENOUS BLD VENIPUNCTURE: CPT

## 2024-06-10 PROCEDURE — 85046 RETICYTE/HGB CONCENTRATE: CPT

## 2024-06-10 PROCEDURE — 84403 ASSAY OF TOTAL TESTOSTERONE: CPT | Performed by: STUDENT IN AN ORGANIZED HEALTH CARE EDUCATION/TRAINING PROGRAM

## 2024-06-10 PROCEDURE — 83540 ASSAY OF IRON: CPT | Performed by: STUDENT IN AN ORGANIZED HEALTH CARE EDUCATION/TRAINING PROGRAM

## 2024-06-10 PROCEDURE — 86880 COOMBS TEST DIRECT: CPT | Performed by: STUDENT IN AN ORGANIZED HEALTH CARE EDUCATION/TRAINING PROGRAM

## 2024-06-11 LAB
ALBUMIN SERPL ELPH-MCNC: 3.8 G/DL (ref 2.9–4.4)
ALBUMIN/GLOB SERPL: 1.4 {RATIO} (ref 0.7–1.7)
ALPHA1 GLOB SERPL ELPH-MCNC: 0.2 G/DL (ref 0–0.4)
ALPHA2 GLOB SERPL ELPH-MCNC: 0.9 G/DL (ref 0.4–1)
B-GLOBULIN SERPL ELPH-MCNC: 0.9 G/DL (ref 0.7–1.3)
EPO SERPL-ACNC: 4.4 MIU/ML (ref 2.6–18.5)
GAMMA GLOB SERPL ELPH-MCNC: 0.7 G/DL (ref 0.4–1.8)
GLOBULIN SER CALC-MCNC: 2.7 G/DL (ref 2.2–3.9)
IGA SERPL-MCNC: 168 MG/DL (ref 90–386)
IGG SERPL-MCNC: 672 MG/DL (ref 603–1613)
IGM SERPL-MCNC: 39 MG/DL (ref 20–172)
LABORATORY COMMENT REPORT: NORMAL
M PROTEIN SERPL ELPH-MCNC: NORMAL G/DL
PROT PATTERN SERPL IFE-IMP: NORMAL
PROT SERPL-MCNC: 6.5 G/DL (ref 6–8.5)

## 2024-06-12 ENCOUNTER — TELEPHONE (OUTPATIENT)
Dept: ONCOLOGY | Facility: CLINIC | Age: 59
End: 2024-06-12
Payer: COMMERCIAL

## 2024-06-12 NOTE — PROGRESS NOTES
Please give the patient the following message:  ----- Results -----  Your test results have been reviewed and are normal.  No changes to your treatment are recommended

## 2024-06-12 NOTE — TELEPHONE ENCOUNTER
----- Message from Luisana May sent at 6/12/2024  8:15 AM EDT -----  Please give the patient the following message:  ----- Results -----  Your test results have been reviewed and are normal.  No changes to your treatment are recommended

## 2024-07-01 ENCOUNTER — TELEPHONE (OUTPATIENT)
Dept: ONCOLOGY | Facility: CLINIC | Age: 59
End: 2024-07-01
Payer: COMMERCIAL

## 2024-07-01 NOTE — TELEPHONE ENCOUNTER
Thank you Kamran.  No interventions from our side needed.  He can discuss his symptoms with primary care.

## 2024-07-01 NOTE — TELEPHONE ENCOUNTER
Called patient after speaking to Dr. May, she said to refer to his PCP for these issues.  I relayed that message to the pt and v/u.

## 2024-07-01 NOTE — TELEPHONE ENCOUNTER
Caller: Cruz Lo    Relationship: Self    Best call back number: [phone]    What is the best time to reach you: ANYTIME    Who are you requesting to speak with (clinical staff, provider,  specific staff member): CLINICAL         What was the call regarding:     WHEN HAD SAW A FEW WEEKS AGO WITH DR PERAZA SHE HAD SAID WAS BORDERLINE ON RECEIVING A SHOT DUE TO HEMOGLOBIN    IS NOT FEELING ANY BETTER AND WANTED TO SEE ABOUT TRYING SOMETHING     HAVING ACHES IN SHOULDERS, EYES SEEM TO BE A LITTLE BLURRY, FREQUENT USE OF BATHROOM AT NIGHT, HAVING STIFFNESS AND WEAKNESS , LOSS OF APPETITE     Is it okay if the provider responds through MyChart: NO

## 2024-08-12 RX ORDER — ATORVASTATIN CALCIUM 10 MG/1
10 TABLET, FILM COATED ORAL DAILY
Qty: 90 TABLET | Refills: 3 | Status: SHIPPED | OUTPATIENT
Start: 2024-08-12

## 2024-09-13 ENCOUNTER — OFFICE VISIT (OUTPATIENT)
Dept: FAMILY MEDICINE CLINIC | Facility: CLINIC | Age: 59
End: 2024-09-13
Payer: COMMERCIAL

## 2024-09-13 VITALS
WEIGHT: 187 LBS | OXYGEN SATURATION: 98 % | HEIGHT: 66 IN | SYSTOLIC BLOOD PRESSURE: 135 MMHG | BODY MASS INDEX: 30.05 KG/M2 | DIASTOLIC BLOOD PRESSURE: 72 MMHG | HEART RATE: 75 BPM

## 2024-09-13 DIAGNOSIS — I12.9 BENIGN HYPERTENSION WITH CKD (CHRONIC KIDNEY DISEASE) STAGE III: Primary | ICD-10-CM

## 2024-09-13 DIAGNOSIS — N18.30 BENIGN HYPERTENSION WITH CKD (CHRONIC KIDNEY DISEASE) STAGE III: Primary | ICD-10-CM

## 2024-09-13 DIAGNOSIS — E78.2 MIXED HYPERLIPIDEMIA: ICD-10-CM

## 2024-09-13 DIAGNOSIS — R35.0 URINARY FREQUENCY: ICD-10-CM

## 2024-09-13 DIAGNOSIS — R53.82 CHRONIC FATIGUE: ICD-10-CM

## 2024-09-13 PROCEDURE — 99214 OFFICE O/P EST MOD 30 MIN: CPT | Performed by: FAMILY MEDICINE

## 2024-09-13 NOTE — PATIENT INSTRUCTIONS
I have ordered lab tests today.  You should receive a phone call or a [employer] message with those results.  If you have not heard from us in 7-10 days, please call the office.      Keep your planned visits with Ms Kent and Dr. May as planned.    Verify the reading on your cuff with another machine.  Sit for a minute or two quietly.  Rest you arm at the level of your heart before starting the cuff.     We talked about mental health and aging and you're still here so live your life.  Also continue to reduce or avoid alcohol.   Counseling could be considered to get past feeling stuck.     Follow through with your flu and covid vaccine.     Bladder irritants can cause urinary frequency and urgency.  Avoid caffeine, citrus, tomatoes and artificial sweeteners to see how that affects your urination.   We could consider a urologist or prostate medication.     Go have some fun or at least plan a vacation.

## 2024-09-13 NOTE — PROGRESS NOTES
"Subjective     Cruz Lo is a 59 y.o. male who presents with   Chief Complaint   Patient presents with    Hypertension    Hyperlipidemia       Hypertension    Hyperlipidemia        Here for follow up of hypertension.   He's on amlodipine 10mg now .  It's running high at home and he's monitoring that with a home cuff and seeing 150's and 180's at times.   It is being managed by Ms. Kent right now.      Hyperlipidemia on atorvastatin.    He's still feeling fatigued.  This has been going on since last winter.   He had been taking B12 and D in the spring but stopped several months ago and had repeat testing and the B12 was normal with or without the supplements recently.  B12 and D often help energy for a couple hours.     He was sent to nephrology and is seeing Ann ENCARNACION there.   He's had high potassium on losartan and that was stopped.     His shoulders have been aching.  No appetite.  Vision seems better.  Just episodes of not feeling right.     He does have urinary frequency.               Review of Systems     Objective     /72   Pulse 75   Ht 168.9 cm (66.5\")   Wt 84.8 kg (187 lb)   SpO2 98%   BMI 29.73 kg/m²     Physical Exam  Constitutional:       Appearance: Normal appearance.   Cardiovascular:      Rate and Rhythm: Normal rate and regular rhythm.      Heart sounds: Normal heart sounds.   Pulmonary:      Effort: Pulmonary effort is normal.      Breath sounds: Normal breath sounds.   Neurological:      Mental Status: He is alert.   Psychiatric:         Behavior: Behavior normal.         Procedures     Assessment & Plan   Diagnoses and all orders for this visit:    1. Benign hypertension with CKD (chronic kidney disease) stage III (Primary)  -     Comprehensive Metabolic Panel  -     CBC & Differential    2. Mixed hyperlipidemia  -     Comprehensive Metabolic Panel  -     Lipid Panel    3. Chronic fatigue  -     Testosterone  -     C-reactive Protein  -     Sedimentation Rate    4. " Urinary frequency       BMI is >= 25 and <30. (Overweight) The following options were offered after discussion;: weight loss educational material (shared in after visit summary)     Discussion    Patient Instructions   I have ordered lab tests today.  You should receive a phone call or a [employer] message with those results.  If you have not heard from us in 7-10 days, please call the office.      Keep your planned visits with Ms Kent and Dr. May as planned.    Verify the reading on your cuff with another machine.  Sit for a minute or two quietly.  Rest you arm at the level of your heart before starting the cuff.     We talked about mental health and aging and you're still here so live your life.  Also continue to reduce or avoid alcohol.   Counseling could be considered to get past feeling stuck.     Bladder irritants can cause urinary frequency and urgency.  Avoid caffeine, citrus, tomatoes and artificial sweeteners to see how that affects your urination.   We could consider a urologist or prostate medication.                 Jeannine Wong MD

## 2024-09-14 LAB
ALBUMIN SERPL-MCNC: 4.2 G/DL (ref 3.5–5.2)
ALBUMIN/GLOB SERPL: 2.1 G/DL
ALP SERPL-CCNC: 68 U/L (ref 39–117)
ALT SERPL-CCNC: 22 U/L (ref 1–41)
AST SERPL-CCNC: 21 U/L (ref 1–40)
BASOPHILS # BLD AUTO: 0.05 10*3/MM3 (ref 0–0.2)
BASOPHILS NFR BLD AUTO: 0.7 % (ref 0–1.5)
BILIRUB SERPL-MCNC: 0.3 MG/DL (ref 0–1.2)
BUN SERPL-MCNC: 21 MG/DL (ref 8–23)
BUN/CREAT SERPL: 13.5 (ref 7–25)
CALCIUM SERPL-MCNC: 9.8 MG/DL (ref 8.6–10.5)
CHLORIDE SERPL-SCNC: 105 MMOL/L (ref 98–107)
CHOLEST SERPL-MCNC: 180 MG/DL (ref 0–200)
CO2 SERPL-SCNC: 25.2 MMOL/L (ref 22–29)
CREAT SERPL-MCNC: 1.55 MG/DL (ref 0.76–1.27)
CRP SERPL-MCNC: <0.3 MG/DL (ref 0–0.5)
EGFRCR SERPLBLD CKD-EPI 2021: 50.9 ML/MIN/1.73
EOSINOPHIL # BLD AUTO: 0.06 10*3/MM3 (ref 0–0.4)
EOSINOPHIL NFR BLD AUTO: 0.8 % (ref 0.3–6.2)
ERYTHROCYTE [DISTWIDTH] IN BLOOD BY AUTOMATED COUNT: 13.1 % (ref 12.3–15.4)
ERYTHROCYTE [SEDIMENTATION RATE] IN BLOOD BY WESTERGREN METHOD: 12 MM/HR (ref 0–20)
GLOBULIN SER CALC-MCNC: 2 GM/DL
GLUCOSE SERPL-MCNC: 114 MG/DL (ref 65–99)
HCT VFR BLD AUTO: 34.2 % (ref 37.5–51)
HDLC SERPL-MCNC: 71 MG/DL (ref 40–60)
HGB BLD-MCNC: 11.3 G/DL (ref 13–17.7)
IMM GRANULOCYTES # BLD AUTO: 0.02 10*3/MM3 (ref 0–0.05)
IMM GRANULOCYTES NFR BLD AUTO: 0.3 % (ref 0–0.5)
LDLC SERPL CALC-MCNC: 87 MG/DL (ref 0–100)
LYMPHOCYTES # BLD AUTO: 1.24 10*3/MM3 (ref 0.7–3.1)
LYMPHOCYTES NFR BLD AUTO: 16.8 % (ref 19.6–45.3)
MCH RBC QN AUTO: 31.3 PG (ref 26.6–33)
MCHC RBC AUTO-ENTMCNC: 33 G/DL (ref 31.5–35.7)
MCV RBC AUTO: 94.7 FL (ref 79–97)
MONOCYTES # BLD AUTO: 0.66 10*3/MM3 (ref 0.1–0.9)
MONOCYTES NFR BLD AUTO: 8.9 % (ref 5–12)
NEUTROPHILS # BLD AUTO: 5.37 10*3/MM3 (ref 1.7–7)
NEUTROPHILS NFR BLD AUTO: 72.5 % (ref 42.7–76)
NRBC BLD AUTO-RTO: 0 /100 WBC (ref 0–0.2)
PLATELET # BLD AUTO: 261 10*3/MM3 (ref 140–450)
POTASSIUM SERPL-SCNC: 5.1 MMOL/L (ref 3.5–5.2)
PROT SERPL-MCNC: 6.2 G/DL (ref 6–8.5)
RBC # BLD AUTO: 3.61 10*6/MM3 (ref 4.14–5.8)
SODIUM SERPL-SCNC: 139 MMOL/L (ref 136–145)
TESTOST SERPL-MCNC: 614 NG/DL (ref 264–916)
TRIGL SERPL-MCNC: 128 MG/DL (ref 0–150)
VLDLC SERPL CALC-MCNC: 22 MG/DL (ref 5–40)
WBC # BLD AUTO: 7.4 10*3/MM3 (ref 3.4–10.8)

## 2024-09-19 ENCOUNTER — OFFICE VISIT (OUTPATIENT)
Dept: ONCOLOGY | Facility: CLINIC | Age: 59
End: 2024-09-19
Payer: COMMERCIAL

## 2024-09-19 ENCOUNTER — LAB (OUTPATIENT)
Dept: LAB | Facility: HOSPITAL | Age: 59
End: 2024-09-19
Payer: COMMERCIAL

## 2024-09-19 VITALS
WEIGHT: 187 LBS | SYSTOLIC BLOOD PRESSURE: 160 MMHG | BODY MASS INDEX: 30.05 KG/M2 | OXYGEN SATURATION: 98 % | HEIGHT: 66 IN | DIASTOLIC BLOOD PRESSURE: 90 MMHG | HEART RATE: 72 BPM | TEMPERATURE: 97.9 F

## 2024-09-19 DIAGNOSIS — R53.83 OTHER FATIGUE: ICD-10-CM

## 2024-09-19 DIAGNOSIS — D64.9 NORMOCYTIC ANEMIA: Primary | ICD-10-CM

## 2024-09-19 DIAGNOSIS — I10 PRIMARY HYPERTENSION: ICD-10-CM

## 2024-09-19 DIAGNOSIS — D64.9 NORMOCYTIC ANEMIA: ICD-10-CM

## 2024-09-19 LAB
BASOPHILS # BLD AUTO: 0.05 10*3/MM3 (ref 0–0.2)
BASOPHILS NFR BLD AUTO: 0.7 % (ref 0–1.5)
DEPRECATED RDW RBC AUTO: 44.2 FL (ref 37–54)
EOSINOPHIL # BLD AUTO: 0.07 10*3/MM3 (ref 0–0.4)
EOSINOPHIL NFR BLD AUTO: 1 % (ref 0.3–6.2)
ERYTHROCYTE [DISTWIDTH] IN BLOOD BY AUTOMATED COUNT: 12.9 % (ref 12.3–15.4)
HCT VFR BLD AUTO: 33.4 % (ref 37.5–51)
HGB BLD-MCNC: 11.2 G/DL (ref 13–17.7)
IMM GRANULOCYTES # BLD AUTO: 0.1 10*3/MM3 (ref 0–0.05)
IMM GRANULOCYTES NFR BLD AUTO: 1.4 % (ref 0–0.5)
LYMPHOCYTES # BLD AUTO: 1.5 10*3/MM3 (ref 0.7–3.1)
LYMPHOCYTES NFR BLD AUTO: 21 % (ref 19.6–45.3)
MCH RBC QN AUTO: 31.2 PG (ref 26.6–33)
MCHC RBC AUTO-ENTMCNC: 33.5 G/DL (ref 31.5–35.7)
MCV RBC AUTO: 93 FL (ref 79–97)
MONOCYTES # BLD AUTO: 0.76 10*3/MM3 (ref 0.1–0.9)
MONOCYTES NFR BLD AUTO: 10.6 % (ref 5–12)
NEUTROPHILS NFR BLD AUTO: 4.67 10*3/MM3 (ref 1.7–7)
NEUTROPHILS NFR BLD AUTO: 65.3 % (ref 42.7–76)
NRBC BLD AUTO-RTO: 0 /100 WBC (ref 0–0.2)
PLATELET # BLD AUTO: 232 10*3/MM3 (ref 140–450)
PMV BLD AUTO: 11 FL (ref 6–12)
RBC # BLD AUTO: 3.59 10*6/MM3 (ref 4.14–5.8)
WBC NRBC COR # BLD AUTO: 7.15 10*3/MM3 (ref 3.4–10.8)

## 2024-09-19 PROCEDURE — 36415 COLL VENOUS BLD VENIPUNCTURE: CPT

## 2024-09-19 PROCEDURE — 85025 COMPLETE CBC W/AUTO DIFF WBC: CPT

## 2024-11-14 ENCOUNTER — TELEPHONE (OUTPATIENT)
Dept: FAMILY MEDICINE CLINIC | Facility: CLINIC | Age: 59
End: 2024-11-14

## 2024-11-14 NOTE — TELEPHONE ENCOUNTER
Talked with Aidan and he could not come in today, he said he will give it a few days and see if how he feels and call back if needed.

## 2024-11-14 NOTE — TELEPHONE ENCOUNTER
"  Caller: Cruz Lo W \"DON\"    Relationship: Self    Best call back number: [phone]    What medication are you requesting: DECONGESTANT     What are your current symptoms: CHEST CONGESTION, COUGH    How long have you been experiencing symptoms: ABOUT A MONTH    Have you had these symptoms before:    [] Yes  [x] No    Have you been treated for these symptoms before:   [] Yes  [x] No    If a prescription is needed, what is your preferred pharmacy and phone number: [facility] [id number] [address] [facility] AT [facility] [phone] [facility] [phone]      Additional notes: STATED THAT THEY WOULD LIKE TO KNOW IF DR. CORTEZ CAN SEND IN A DECONGESTANT OR SOMETHING THAT CAN CLEAR OUT WHATEVER IT IS IN THE BOTTOM OF THEIR LUNGS. PLEASE CALL AND ADVISE       "

## 2024-12-16 ENCOUNTER — TELEPHONE (OUTPATIENT)
Dept: FAMILY MEDICINE CLINIC | Facility: CLINIC | Age: 59
End: 2024-12-16
Payer: COMMERCIAL

## 2024-12-16 DIAGNOSIS — M25.511 PAIN OF BOTH SHOULDER JOINTS: Primary | ICD-10-CM

## 2024-12-16 DIAGNOSIS — M25.512 PAIN OF BOTH SHOULDER JOINTS: Primary | ICD-10-CM

## 2025-01-09 ENCOUNTER — OFFICE VISIT (OUTPATIENT)
Dept: SPORTS MEDICINE | Facility: CLINIC | Age: 60
End: 2025-01-09
Payer: COMMERCIAL

## 2025-01-09 VITALS
OXYGEN SATURATION: 99 % | BODY MASS INDEX: 30.05 KG/M2 | WEIGHT: 187 LBS | DIASTOLIC BLOOD PRESSURE: 70 MMHG | HEIGHT: 66 IN | RESPIRATION RATE: 16 BRPM | HEART RATE: 71 BPM | SYSTOLIC BLOOD PRESSURE: 132 MMHG

## 2025-01-09 DIAGNOSIS — M25.512 ACUTE PAIN OF BOTH SHOULDERS: Primary | ICD-10-CM

## 2025-01-09 DIAGNOSIS — M25.511 ACUTE PAIN OF BOTH SHOULDERS: Primary | ICD-10-CM

## 2025-01-09 DIAGNOSIS — M54.2 CERVICALGIA: ICD-10-CM

## 2025-01-09 RX ORDER — AMLODIPINE BESYLATE 10 MG/1
1 TABLET ORAL DAILY
COMMUNITY
Start: 2024-10-11 | End: 2025-10-11

## 2025-01-09 NOTE — PROGRESS NOTES
Cruz is a 60 y.o. year old male presents to [facility]    Chief Complaint   Patient presents with    Shoulder Pain     PCP referral for eval of b/l shoulder pain - reports pain started at night time, NKI, pain for about 2 months, no radiating pain, no neuro sxs but periodically numbness in the right thumb - here with new x-rays for further evaluation and treatment      New patient to practice.  Seen at the request of Dr. Wong.    History of Present Illness  History of Present Illness  The patient is a 60-year-old male who presents for evaluation of bilateral shoulder pain.    He has been experiencing bilateral shoulder pain for several months, which he attributes to a decline in his overall health over the past year. The onset of the pain was approximately 2 months ago, initially presenting as an ache in both shoulders during the night. The intensity of the pain escalated to the point where it would awaken him 4 to 5 times per night. This severe pain persisted for a few weeks before subsiding. He reports no associated weakness in the shoulders and has not sought pharmacological intervention for the pain. He occasionally uses Tylenol for relief. He describes the pain as more akin to a bone issue rather than muscular discomfort. He also reports recent lower neck pain, which he believes is work-related due to prolonged periods of looking down. He notes that his symptoms improve following morning workouts. He does not use Advil due to concurrent kidney issues and is under the care of a nephrologist. He expresses a preference for self-management of his symptoms, with medication being a last resort. He attempted to alleviate the discomfort by switching to a thinner pillow, which resulted in a temporary resolution of the pain. However, the pain has since returned, prompting him to seek medical attention.    MEDICATIONS  Current: Tylenol    I have reviewed the patient's medical,  "family, and social history in detail and updated the computerized patient record.    /70 (BP Location: Left arm, Patient Position: Sitting, Cuff Size: Adult)   Pulse 71   Resp 16   Ht 168.9 cm (66.5\")   Wt 84.8 kg (187 lb)   SpO2 99%   BMI 29.73 kg/m²      Physical Exam    Vital signs reviewed.   General: No acute distress.  Eyes: conjunctiva clear; pupils equally round and reactive  ENT: external ears atraumatic  CV: no peripheral edema  Resp: normal respiratory effort, no use of accessory muscles  Skin: no rashes or wounds; normal turgor  Psych: mood and affect appropriate; recent and remote memory intact  Neuro: sensation to light touch intact    MSK Exam  Physical Exam  Bilateral shoulders show negative drop arm, negative empty can. Full range of motion. Negative apprehension test. Negative Speed's sign. No pain with belly press nor lift off test bilaterally.    Bilateral shoulder X-Ray  Indication: Pain  Views: AP internal and external    Findings:  No fracture  No bony lesion  Normal soft tissues  Moderate age-related osteoarthritis of acromioclavicular joints    No prior studies were available for comparison.      Results           Diagnoses and all orders for this visit:    Acute pain of both shoulders  -     XR Shoulder 2+ View Bilateral    Cervicalgia    Other orders  -     amLODIPine (NORVASC) 10 MG tablet; Take 1 tablet by mouth Daily.      Assessment & Plan  1. Bilateral shoulder pain.  Differential diagnosis of his presenting complaint is cervical radiculitis versus possible subacromial bursitis. The pain is primarily nocturnal, which is a common presentation of this condition. His age-matched arthritis of the acromioclavicular joints may also be contributing to his discomfort. However, the absence of pain during the day and the inability to reproduce his pain on examination are reassuring signs. It is possible that his symptoms could be originating from the neck, given that the nerves " supplying the shoulders and arms emanate from this region. The patient's rotator cuffs are intact with no evidence of tears. He has been advised to monitor his neck position and consider elevating his workbench to avoid prolonged downward gaze. Side sleeping is recommended, and he has been encouraged to find a pillow that maintains spinal neutrality. Gentle range of motion exercises for the neck have been suggested to alleviate stiffness. A handout detailing home exercises for the neck will be provided. Should his symptoms worsen, he is to contact the clinic immediately.          Follow Up  as needed    Patient was given instructions and counseling regarding his condition or for health maintenance advice. Please see specific information pulled into the AVS if appropriate.     Patient or patient representative verbalized consent for the use of Ambient Listening during the visit with  FRANCINE Jewell Jr.,  for chart documentation. 1/9/2025  09:38 EST

## 2025-01-10 ENCOUNTER — PATIENT ROUNDING (BHMG ONLY) (OUTPATIENT)
Dept: SPORTS MEDICINE | Facility: CLINIC | Age: 60
End: 2025-01-10
Payer: COMMERCIAL

## 2025-01-10 NOTE — PROGRESS NOTES
January 10, 2025      A [email or web address] Message has been sent to the patient for PATIENT [place] [facility]

## 2025-04-18 ENCOUNTER — OFFICE VISIT (OUTPATIENT)
Dept: FAMILY MEDICINE CLINIC | Facility: CLINIC | Age: 60
End: 2025-04-18
Payer: COMMERCIAL

## 2025-04-18 VITALS
DIASTOLIC BLOOD PRESSURE: 86 MMHG | OXYGEN SATURATION: 98 % | HEIGHT: 67 IN | SYSTOLIC BLOOD PRESSURE: 171 MMHG | BODY MASS INDEX: 29.73 KG/M2 | HEART RATE: 77 BPM | WEIGHT: 189.4 LBS

## 2025-04-18 DIAGNOSIS — F10.10 ALCOHOL ABUSE: ICD-10-CM

## 2025-04-18 DIAGNOSIS — Z13.6 ENCOUNTER FOR SCREENING FOR CORONARY ARTERY DISEASE: ICD-10-CM

## 2025-04-18 DIAGNOSIS — E78.2 MIXED HYPERLIPIDEMIA: ICD-10-CM

## 2025-04-18 DIAGNOSIS — Z13.6 ENCOUNTER FOR SCREENING FOR VASCULAR DISEASE: ICD-10-CM

## 2025-04-18 DIAGNOSIS — Z13.1 SCREENING FOR DIABETES MELLITUS (DM): ICD-10-CM

## 2025-04-18 DIAGNOSIS — Z23 ENCOUNTER FOR VACCINATION: ICD-10-CM

## 2025-04-18 DIAGNOSIS — Z12.5 SCREENING PSA (PROSTATE SPECIFIC ANTIGEN): ICD-10-CM

## 2025-04-18 DIAGNOSIS — E53.9 VITAMIN B DEFICIENCY: ICD-10-CM

## 2025-04-18 DIAGNOSIS — D64.9 MILD ANEMIA: ICD-10-CM

## 2025-04-18 DIAGNOSIS — E53.8 FOLATE DEFICIENCY: ICD-10-CM

## 2025-04-18 DIAGNOSIS — I12.9 BENIGN HYPERTENSION WITH CKD (CHRONIC KIDNEY DISEASE) STAGE III: Primary | ICD-10-CM

## 2025-04-18 DIAGNOSIS — N18.30 BENIGN HYPERTENSION WITH CKD (CHRONIC KIDNEY DISEASE) STAGE III: Primary | ICD-10-CM

## 2025-04-18 DIAGNOSIS — E55.9 VITAMIN D DEFICIENCY: ICD-10-CM

## 2025-04-18 RX ORDER — LISINOPRIL 5 MG/1
10 TABLET ORAL DAILY
COMMUNITY
Start: 2025-02-19 | End: 2026-02-19

## 2025-04-18 RX ORDER — ERGOCALCIFEROL 1.25 MG/1
50000 CAPSULE, LIQUID FILLED ORAL WEEKLY
COMMUNITY

## 2025-04-18 NOTE — PROGRESS NOTES
"Subjective     Cruz Lo is a 60 y.o. male who presents with   Chief Complaint   Patient presents with    Hypertension       Hypertension    A lot of his problems started after he got covid in 2023.      He's still having a lot of shoulder pain.  He saw Dr. Jewell for this and has not been back.  It is affecting his sleep.     He's seen Ann ENCARNACION at [facility].  He's on lisinopril 10mg and amlodipine 10mg .  He's having a hard time with his blood pressure and when I reviewed her last note lists hydralazine 25mg twice a daylisinopril 5mg, amlodipine 10mg and hydrochlorothiazide 25mg.   He's having some issues with swelling in his legs in the past 2 months.     He's wondering if blood flow is an issue with his blood pressure.  He is a  by [employer] and understands flow.  His parents have had issues as well.  He works a 12 hour day.     Mild anemia and has seen Dr. May for this but no specific cause found. He's had vitamin issues in the past.      He tends to drink alcohol about once a week but it's a volume issue and will drink a bottle in a day.      He feels better if he eats well but he doesn't tend to.              Review of Systems     Objective     /86   Pulse 77   Ht 168.9 cm (66.5\")   Wt 85.9 kg (189 lb 6.4 oz)   SpO2 98%   BMI 30.11 kg/m²     Physical Exam  Constitutional:       Appearance: Normal appearance.   Cardiovascular:      Rate and Rhythm: Normal rate and regular rhythm.      Heart sounds: Normal heart sounds.   Pulmonary:      Effort: Pulmonary effort is normal.      Breath sounds: Normal breath sounds.   Musculoskeletal:      Right lower leg: Edema present.      Left lower leg: Edema present.   Neurological:      Mental Status: He is alert.   Psychiatric:         Behavior: Behavior normal.         Procedures     Assessment & Plan   Diagnoses and all orders for this visit:    1. Benign hypertension with CKD (chronic kidney disease) stage III (Primary)  -     CBC " & Differential  -     Comprehensive Metabolic Panel    2. Encounter for screening for vascular disease  -     Vascular Screening (Bundle) CAR; Future    3. Encounter for screening for coronary artery disease  -     CT Cardiac Calcium Score Without Dye; Future    4. Mixed hyperlipidemia    5. Vitamin D deficiency  -     Vitamin D,25-Hydroxy    6. Vitamin B deficiency  -     Vitamin B12    7. Folate deficiency  -     Folate    8. Mild anemia  -     Iron Profile    9. Alcohol abuse  Assessment & Plan:  Volume issue when he drinks      10. Screening PSA (prostate specific antigen)  -     PSA Screen    11. Screening for diabetes mellitus (DM)  -     Hemoglobin A1c    12. Encounter for vaccination  -     Pneumococcal Conjugate Vaccine 20-Valent All             Discussion    Patient Instructions   [phone] is Dr. Jewell' office.  Call his office for follow up and to talk about shots.     You can take Tylenol and that should not exceed 3 grams in a day and that's two extra strength three times a day.  Taking it at bedtime regularly would be useful and may help your blood pressure.    I did not change your blood pressure medication because it's seems like there are medicine that Ms. Kent has in your note that you are not taking so just clarify that information.  Salt and alcohol can contribute to blood pressure issues.     Amlodipine can cause swelling in your legs but we are doing vascular screening. (Those are pay out of pocket).               Jeannine Wong MD

## 2025-04-18 NOTE — PATIENT INSTRUCTIONS
[phone] is Dr. Jewell' office.  Call his office for follow up and to talk about shots.     You can take Tylenol and that should not exceed 3 grams in a day and that's two extra strength three times a day.  Taking it at bedtime regularly would be useful and may help your blood pressure.    I did not change your blood pressure medication because it's seems like there are medicine that Ms. Kent has in your note that you are not taking so just clarify that information.  Salt and alcohol can contribute to blood pressure issues.     Amlodipine can cause swelling in your legs but we are doing vascular screening. (Those are pay out of pocket).

## 2025-04-19 LAB
25(OH)D3+25(OH)D2 SERPL-MCNC: 43.5 NG/ML (ref 30–100)
ALBUMIN SERPL-MCNC: 4.2 G/DL (ref 3.9–4.9)
ALP SERPL-CCNC: 68 IU/L (ref 44–121)
ALT SERPL-CCNC: 14 IU/L (ref 0–44)
AST SERPL-CCNC: 14 IU/L (ref 0–40)
BASOPHILS # BLD AUTO: 0.1 X10E3/UL (ref 0–0.2)
BASOPHILS NFR BLD AUTO: 1 %
BILIRUB SERPL-MCNC: <0.2 MG/DL (ref 0–1.2)
BUN SERPL-MCNC: 33 MG/DL (ref 8–27)
BUN/CREAT SERPL: 14 (ref 10–24)
CALCIUM SERPL-MCNC: 9.1 MG/DL (ref 8.6–10.2)
CHLORIDE SERPL-SCNC: 112 MMOL/L (ref 96–106)
CO2 SERPL-SCNC: 19 MMOL/L (ref 20–29)
CREAT SERPL-MCNC: 2.33 MG/DL (ref 0.76–1.27)
EGFRCR SERPLBLD CKD-EPI 2021: 31 ML/MIN/1.73
EOSINOPHIL # BLD AUTO: 0.2 X10E3/UL (ref 0–0.4)
EOSINOPHIL NFR BLD AUTO: 4 %
ERYTHROCYTE [DISTWIDTH] IN BLOOD BY AUTOMATED COUNT: 13.1 % (ref 11.6–15.4)
FOLATE SERPL-MCNC: 7.1 NG/ML
GLOBULIN SER CALC-MCNC: 2 G/DL (ref 1.5–4.5)
GLUCOSE SERPL-MCNC: 92 MG/DL (ref 70–99)
HBA1C MFR BLD: 5.6 % (ref 4.8–5.6)
HCT VFR BLD AUTO: 31.2 % (ref 37.5–51)
HGB BLD-MCNC: 10.1 G/DL (ref 13–17.7)
IMM GRANULOCYTES # BLD AUTO: 0 X10E3/UL (ref 0–0.1)
IMM GRANULOCYTES NFR BLD AUTO: 0 %
IRON SATN MFR SERPL: 18 % (ref 15–55)
IRON SERPL-MCNC: 52 UG/DL (ref 38–169)
LYMPHOCYTES # BLD AUTO: 1.5 X10E3/UL (ref 0.7–3.1)
LYMPHOCYTES NFR BLD AUTO: 25 %
MCH RBC QN AUTO: 29.8 PG (ref 26.6–33)
MCHC RBC AUTO-ENTMCNC: 32.4 G/DL (ref 31.5–35.7)
MCV RBC AUTO: 92 FL (ref 79–97)
MONOCYTES # BLD AUTO: 0.7 X10E3/UL (ref 0.1–0.9)
MONOCYTES NFR BLD AUTO: 12 %
NEUTROPHILS # BLD AUTO: 3.4 X10E3/UL (ref 1.4–7)
NEUTROPHILS NFR BLD AUTO: 58 %
PLATELET # BLD AUTO: 246 X10E3/UL (ref 150–450)
POTASSIUM SERPL-SCNC: 5.6 MMOL/L (ref 3.5–5.2)
PROT SERPL-MCNC: 6.2 G/DL (ref 6–8.5)
PSA SERPL-MCNC: 2.4 NG/ML (ref 0–4)
RBC # BLD AUTO: 3.39 X10E6/UL (ref 4.14–5.8)
SODIUM SERPL-SCNC: 145 MMOL/L (ref 134–144)
TIBC SERPL-MCNC: 293 UG/DL (ref 250–450)
UIBC SERPL-MCNC: 241 UG/DL (ref 111–343)
VIT B12 SERPL-MCNC: 890 PG/ML (ref 232–1245)
WBC # BLD AUTO: 5.8 X10E3/UL (ref 3.4–10.8)

## 2025-04-21 ENCOUNTER — PROCEDURE VISIT (OUTPATIENT)
Dept: SPORTS MEDICINE | Facility: CLINIC | Age: 60
End: 2025-04-21
Payer: COMMERCIAL

## 2025-04-21 ENCOUNTER — TELEPHONE (OUTPATIENT)
Dept: SPORTS MEDICINE | Facility: CLINIC | Age: 60
End: 2025-04-21

## 2025-04-21 VITALS
DIASTOLIC BLOOD PRESSURE: 80 MMHG | RESPIRATION RATE: 16 BRPM | BODY MASS INDEX: 30.37 KG/M2 | OXYGEN SATURATION: 99 % | WEIGHT: 189 LBS | HEART RATE: 75 BPM | SYSTOLIC BLOOD PRESSURE: 130 MMHG | HEIGHT: 66 IN

## 2025-04-21 DIAGNOSIS — M75.52 SUBACROMIAL BURSITIS OF BOTH SHOULDERS: ICD-10-CM

## 2025-04-21 DIAGNOSIS — M25.511 CHRONIC PAIN OF BOTH SHOULDERS: Primary | ICD-10-CM

## 2025-04-21 DIAGNOSIS — M75.51 SUBACROMIAL BURSITIS OF BOTH SHOULDERS: ICD-10-CM

## 2025-04-21 DIAGNOSIS — M25.512 CHRONIC PAIN OF BOTH SHOULDERS: Primary | ICD-10-CM

## 2025-04-21 DIAGNOSIS — G89.29 CHRONIC PAIN OF BOTH SHOULDERS: Primary | ICD-10-CM

## 2025-04-21 PROCEDURE — 99213 OFFICE O/P EST LOW 20 MIN: CPT | Performed by: FAMILY MEDICINE

## 2025-04-21 PROCEDURE — 20610 DRAIN/INJ JOINT/BURSA W/O US: CPT | Performed by: FAMILY MEDICINE

## 2025-04-21 RX ORDER — METHYLPREDNISOLONE ACETATE 80 MG/ML
80 INJECTION, SUSPENSION INTRA-ARTICULAR; INTRALESIONAL; INTRAMUSCULAR; SOFT TISSUE
Status: COMPLETED | OUTPATIENT
Start: 2025-04-21 | End: 2025-04-21

## 2025-04-21 RX ORDER — SODIUM ZIRCONIUM CYCLOSILICATE 5 G/5G
5 POWDER, FOR SUSPENSION ORAL WEEKLY
COMMUNITY
Start: 2025-02-21 | End: 2026-02-21

## 2025-04-21 RX ADMIN — METHYLPREDNISOLONE ACETATE 80 MG: 80 INJECTION, SUSPENSION INTRA-ARTICULAR; INTRALESIONAL; INTRAMUSCULAR; SOFT TISSUE at 16:48

## 2025-04-21 NOTE — PROGRESS NOTES
"Cruz is a 60 y.o. year old male presents to [facility]    Chief Complaint   Patient presents with    Shoulder Pain     F/u eval for b/l shoulder pain with cervicalgia, reports he would like to have injection for bursitis  - here for consideration of b/l shoulder steroid injections        History of Present Illness  History of Present Illness  The patient presents for evaluation of bilateral shoulder pain.    He reports persistent bilateral shoulder pain, which does not radiate down the arms. The pain is constant, but its intensity varies. It is particularly bothersome at night, disrupting his sleep and causing him to wake up approximately 3 times per night. He also experiences frequent nighttime urination, although he does not associate this with his shoulder pain. He has no issues with neck mobility, including turning left or right, or moving up or down. He is seeking relief from his symptoms, which he finds exhausting. He has not previously received cortisone injections. He has a history of back surgery.    Supplemental Information  Additionally, he notes a pattern of elevated blood pressure in the morning, which decreases as the day progresses.    I have reviewed the patient's medical, family, and social history in detail and updated the computerized patient record.    /80 (BP Location: Left arm, Patient Position: Sitting, Cuff Size: Adult)   Pulse 75   Resp 16   Ht 168.9 cm (66.5\")   Wt 85.7 kg (189 lb)   SpO2 99%   BMI 30.05 kg/m²      Physical Exam    Vital signs reviewed.   General: No acute distress.  Eyes: conjunctiva clear; pupils equally round and reactive  ENT: external ears atraumatic  CV: no peripheral edema  Resp: normal respiratory effort, no use of accessory muscles  Skin: no rashes or wounds; normal turgor  Psych: mood and affect appropriate; recent and remote memory intact  Neuro: sensation to light touch intact    MSK Exam  Physical Exam  Bilateral " shoulders show a negative drop arm, negative empty can, negative Sims, negative Neer sign, negative belly press, negative lift off, and negative scarf sign.            Large Joint Arthrocentesis: R subacromial bursa  Date/Time: 4/21/2025 4:48 PM  Consent given by: patient  Site marked: site marked  Timeout: Immediately prior to procedure a time out was called to verify the correct patient, procedure, equipment, support staff and site/side marked as required   Supporting Documentation  Indications: pain   Procedure Details  Location: shoulder - R subacromial bursa  Needle size: 25 G  Approach: posterior  Medications administered: 80 mg methylPREDNISolone acetate 80 MG/ML  Patient tolerance: patient tolerated the procedure well with no immediate complications      Large Joint Arthrocentesis: L subacromial bursa  Date/Time: 4/21/2025 4:48 PM  Consent given by: patient  Site marked: site marked  Timeout: Immediately prior to procedure a time out was called to verify the correct patient, procedure, equipment, support staff and site/side marked as required   Supporting Documentation  Indications: pain   Procedure Details  Location: shoulder - L subacromial bursa  Needle size: 25 G  Approach: posterior  Medications administered: 80 mg methylPREDNISolone acetate 80 MG/ML  Patient tolerance: patient tolerated the procedure well with no immediate complications          Diagnoses and all orders for this visit:    1. Chronic pain of both shoulders (Primary)    2. Subacromial bursitis of both shoulders  -     Large Joint Arthrocentesis  -     Large Joint Arthrocentesis        Assessment & Plan  1. Bilateral shoulder pain.  The patient reports persistent bilateral shoulder pain that is more pronounced at night, causing him to wake up three times on average. The pain is localized to the shoulders and does not radiate down the arms. Physical examination did not reproduce the pain, and there is tenderness along the AC joint.  Differential diagnoses include bursitis, impingement, and rotator cuff issues. Cortisone injections were administered into both shoulders to provide pain relief. He was informed that the injections might take up to 3 weeks to provide full relief and could last up to 3 months. If there is no significant improvement within 3 weeks, he should contact the office for further evaluation and potential alternative treatments.    PROCEDURE  Cortisone injections were administered into both shoulders today.          Patient or patient representative verbalized consent for the use of Ambient Listening during the visit with  FRANCINE Jewell Jr.,  for chart documentation on 04/21/2025 at 15:25 EDT.

## 2025-04-24 ENCOUNTER — TELEPHONE (OUTPATIENT)
Dept: FAMILY MEDICINE CLINIC | Facility: CLINIC | Age: 60
End: 2025-04-24
Payer: COMMERCIAL

## 2025-04-24 NOTE — TELEPHONE ENCOUNTER
Informed pt that it can take 10-14 days to hear back. I gave him central schedulings number for him to contact them if he has not heard anything by next week

## 2025-04-24 NOTE — TELEPHONE ENCOUNTER
"  Caller: Cruz Lo W \"DON\"    Relationship: Self    Best call back number: [phone]    Who are you requesting to speak with (clinical staff, provider,  specific staff member): CLINICAL    What was the call regarding: CHECKING STATUS OF ORDER FOR VASCULAR SCAN. PATIENT STATES HE HAS NOT BEEN CONTACTED TO SCHEDULE  PLEASE ADVISE       "

## 2025-05-01 ENCOUNTER — OFFICE VISIT (OUTPATIENT)
Dept: SURGERY | Facility: CLINIC | Age: 60
End: 2025-05-01
Payer: COMMERCIAL

## 2025-05-01 VITALS
OXYGEN SATURATION: 95 % | WEIGHT: 190 LBS | BODY MASS INDEX: 29.82 KG/M2 | HEART RATE: 67 BPM | HEIGHT: 67 IN | DIASTOLIC BLOOD PRESSURE: 80 MMHG | SYSTOLIC BLOOD PRESSURE: 138 MMHG

## 2025-05-01 DIAGNOSIS — R63.0 LOSS OF APPETITE: ICD-10-CM

## 2025-05-01 DIAGNOSIS — D64.9 ANEMIA, UNSPECIFIED TYPE: Primary | ICD-10-CM

## 2025-05-01 DIAGNOSIS — Z86.0100 HISTORY OF COLON POLYPS: ICD-10-CM

## 2025-05-01 DIAGNOSIS — K92.1 BLACK STOOL: ICD-10-CM

## 2025-05-01 DIAGNOSIS — I1A.0 RESISTANT HYPERTENSION: ICD-10-CM

## 2025-05-01 PROCEDURE — 99203 OFFICE O/P NEW LOW 30 MIN: CPT | Performed by: PHYSICIAN ASSISTANT

## 2025-05-01 RX ORDER — CARVEDILOL 3.12 MG/1
TABLET ORAL
COMMUNITY

## 2025-05-01 NOTE — PROGRESS NOTES
ASSESSMENT/PLAN:    60-year-old gentleman with anemia and occasional dark or black stools with a history of adenomatous polyps in the past.  As such I am recommending along with Dr. Dobson to proceed with a colonoscopy and EGD.  Risks and rationale were discussed with him with the risk including but not limited to bleeding, infection, bowel perforation and the need for additional procedures.  Any additional follow-up will be discussed once the results of been reviewed.  Additionally given his concerns for his blood pressure issues we will proceed with a CT of the abdomen to inspect his adrenals.  Once the results have been reviewed we will discuss any additional recommendations at that time as well.  All questions were answered and he was willing to proceed with all recommendations.    CC:     Anemia    HPI:    This is a 60-year-old gentleman presenting to the office today at the request of Dr. Jeannine Cisneros for consultation.  Over the last several weeks to months he has noticed a loss of appetite as well as darker occasionally black stools.  On routine lab work over the last several weeks he has demonstrated anemia with his hemoglobin being as low as 10.1 but with normal iron profile.  He does have a history of adenomatous polyps in the past and is due for a routine screening colonoscopy.  He denies having nausea, vomiting, indigestion, abdominal pain, dark tarry stools or bright red blood mixed in with his bowel movements.  He is concerned about his blood pressure as he feels it is typically very high in the morning when he checks it.    ENDOSCOPY:   Colonoscopy 2020    SOCIAL HISTORY:   Denies tobacco use  Occasional alcohol use    FAMILY HISTORY:    Colorectal cancer: None    PREVIOUS ABDOMINAL SURGERY    None    OTHER SURGERY  Past Surgical History:   Procedure Laterality Date    BACK SURGERY      COLONOSCOPY  07/2020    WRIST FUSION         PAST MEDICAL HISTORY:    Past Medical History:   Diagnosis Date  "   Alcohol abuse 10/20/2022    Cough 10/20/2022    Essential hypertension 07/29/2022    History of colon polyps     History of COVID-19 10/20/2022    Mixed hyperlipidemia 10/20/2022    Other fatigue 07/29/2022    Vitamin B deficiency 07/29/2022    Vitamin D deficiency 07/29/2022       MEDICATIONS:     Current Outpatient Medications:     amLODIPine (NORVASC) 10 MG tablet, Take 1 tablet by mouth Daily., Disp: , Rfl:     atorvastatin (LIPITOR) 10 MG tablet, TAKE 1 TABLET BY MOUTH DAILY, Disp: 90 tablet, Rfl: 3    carvedilol (COREG) 3.125 MG tablet, TAKE 1 TABLET BY MOUTH EVERY MORNING AND TAKE 1 TABLET BY MOUTH EVERY EVENING WITH MEALS, Disp: , Rfl:     Dupixent 300 MG/2ML solution pen-injector, , Disp: , Rfl:     Lokelma 5 g packet, Take 5 g by mouth 1 (One) Time Per Week., Disp: , Rfl:     vitamin D (ERGOCALCIFEROL) 1.25 MG (01951 UT) capsule capsule, Take 1 capsule by mouth 1 (One) Time Per Week., Disp: , Rfl:     ALLERGIES:   No Known Allergies    PHYSICAL EXAM:   Constitutional: Well-developed well-nourished, no acute distress  Vital signs:   Height 66.5\"  Weight 190  BMI 30.2  Neck: Supple, no palpable mass, trachea midline  Respiratory: Clear to auscultation, normal inspiratory effort  Cardiovascular: Regular rate, no murmur, no carotid bruit  Gastrointestinal: Soft, nontender  Psychiatric: Alert and oriented ×3, normal affect          Vlad Monae PA-C    [facility] - General Surgery  [address], [address]  [address], [address]  [place] [address]    Office: [phone]  Fax: [phone]     "

## 2025-05-01 NOTE — H&P (VIEW-ONLY)
ASSESSMENT/PLAN:    60-year-old gentleman with anemia and occasional dark or black stools with a history of adenomatous polyps in the past.  As such I am recommending along with Dr. Dobson to proceed with a colonoscopy and EGD.  Risks and rationale were discussed with him with the risk including but not limited to bleeding, infection, bowel perforation and the need for additional procedures.  Any additional follow-up will be discussed once the results of been reviewed.  Additionally given his concerns for his blood pressure issues we will proceed with a CT of the abdomen to inspect his adrenals.  Once the results have been reviewed we will discuss any additional recommendations at that time as well.  All questions were answered and he was willing to proceed with all recommendations.    CC:     Anemia    HPI:    This is a 60-year-old gentleman presenting to the office today at the request of Dr. Jeannine Cisneros for consultation.  Over the last several weeks to months he has noticed a loss of appetite as well as darker occasionally black stools.  On routine lab work over the last several weeks he has demonstrated anemia with his hemoglobin being as low as 10.1 but with normal iron profile.  He does have a history of adenomatous polyps in the past and is due for a routine screening colonoscopy.  He denies having nausea, vomiting, indigestion, abdominal pain, dark tarry stools or bright red blood mixed in with his bowel movements.  He is concerned about his blood pressure as he feels it is typically very high in the morning when he checks it.    ENDOSCOPY:   Colonoscopy 2020    SOCIAL HISTORY:   Denies tobacco use  Occasional alcohol use    FAMILY HISTORY:    Colorectal cancer: None    PREVIOUS ABDOMINAL SURGERY    None    OTHER SURGERY  Past Surgical History:   Procedure Laterality Date    BACK SURGERY      COLONOSCOPY  07/2020    WRIST FUSION         PAST MEDICAL HISTORY:    Past Medical History:   Diagnosis Date  "   Alcohol abuse 10/20/2022    Cough 10/20/2022    Essential hypertension 07/29/2022    History of colon polyps     History of COVID-19 10/20/2022    Mixed hyperlipidemia 10/20/2022    Other fatigue 07/29/2022    Vitamin B deficiency 07/29/2022    Vitamin D deficiency 07/29/2022       MEDICATIONS:     Current Outpatient Medications:     amLODIPine (NORVASC) 10 MG tablet, Take 1 tablet by mouth Daily., Disp: , Rfl:     atorvastatin (LIPITOR) 10 MG tablet, TAKE 1 TABLET BY MOUTH DAILY, Disp: 90 tablet, Rfl: 3    carvedilol (COREG) 3.125 MG tablet, TAKE 1 TABLET BY MOUTH EVERY MORNING AND TAKE 1 TABLET BY MOUTH EVERY EVENING WITH MEALS, Disp: , Rfl:     Dupixent 300 MG/2ML solution pen-injector, , Disp: , Rfl:     Lokelma 5 g packet, Take 5 g by mouth 1 (One) Time Per Week., Disp: , Rfl:     vitamin D (ERGOCALCIFEROL) 1.25 MG (22529 UT) capsule capsule, Take 1 capsule by mouth 1 (One) Time Per Week., Disp: , Rfl:     ALLERGIES:   No Known Allergies    PHYSICAL EXAM:   Constitutional: Well-developed well-nourished, no acute distress  Vital signs:   Height 66.5\"  Weight 190  BMI 30.2  Neck: Supple, no palpable mass, trachea midline  Respiratory: Clear to auscultation, normal inspiratory effort  Cardiovascular: Regular rate, no murmur, no carotid bruit  Gastrointestinal: Soft, nontender  Psychiatric: Alert and oriented ×3, normal affect          Vlad Monae PA-C    [facility] - General Surgery  [address], [address]  [address], [address]  [place] [address]    Office: [phone]  Fax: [phone]     "

## 2025-05-02 NOTE — PROGRESS NOTES
I have reviewed the notes, assessments, and/or procedures performed by Vlad Monae, I concur with her/his documentation of Cruz Lo.

## 2025-05-20 ENCOUNTER — TELEPHONE (OUTPATIENT)
Dept: SURGERY | Facility: CLINIC | Age: 60
End: 2025-05-20
Payer: COMMERCIAL

## 2025-05-20 NOTE — TELEPHONE ENCOUNTER
Spoke to the pt regarding a updated arrival time for sx on 5/21 with Dr Dobson. He now needs to arrive at 8:30 am.

## 2025-05-21 ENCOUNTER — HOSPITAL ENCOUNTER (OUTPATIENT)
Facility: HOSPITAL | Age: 60
Setting detail: HOSPITAL OUTPATIENT SURGERY
Discharge: HOME | End: 2025-05-21
Attending: SURGERY | Admitting: SURGERY
Payer: COMMERCIAL

## 2025-05-21 ENCOUNTER — ANESTHESIA (OUTPATIENT)
Dept: GASTROENTEROLOGY | Facility: HOSPITAL | Age: 60
End: 2025-05-21
Payer: COMMERCIAL

## 2025-05-21 ENCOUNTER — ANESTHESIA EVENT (OUTPATIENT)
Dept: GASTROENTEROLOGY | Facility: HOSPITAL | Age: 60
End: 2025-05-21
Payer: COMMERCIAL

## 2025-05-21 VITALS
HEART RATE: 76 BPM | SYSTOLIC BLOOD PRESSURE: 145 MMHG | RESPIRATION RATE: 16 BRPM | OXYGEN SATURATION: 98 % | DIASTOLIC BLOOD PRESSURE: 85 MMHG

## 2025-05-21 PROCEDURE — 25010000002 GLYCOPYRROLATE 0.2 MG/ML SOLUTION: Performed by: NURSE ANESTHETIST, CERTIFIED REGISTERED

## 2025-05-21 PROCEDURE — 25010000002 LIDOCAINE 2% SOLUTION: Performed by: NURSE ANESTHETIST, CERTIFIED REGISTERED

## 2025-05-21 PROCEDURE — 25810000003 LACTATED RINGERS PER 1000 ML: Performed by: SURGERY

## 2025-05-21 PROCEDURE — 43235 EGD DIAGNOSTIC BRUSH WASH: CPT | Performed by: SURGERY

## 2025-05-21 PROCEDURE — 45378 DIAGNOSTIC COLONOSCOPY: CPT | Performed by: SURGERY

## 2025-05-21 PROCEDURE — 25010000002 PROPOFOL 10 MG/ML EMULSION: Performed by: NURSE ANESTHETIST, CERTIFIED REGISTERED

## 2025-05-21 RX ORDER — GLYCOPYRROLATE 0.2 MG/ML
INJECTION INTRAMUSCULAR; INTRAVENOUS AS NEEDED
Status: DISCONTINUED | OUTPATIENT
Start: 2025-05-21 | End: 2025-05-21 | Stop reason: SURG

## 2025-05-21 RX ORDER — LIDOCAINE HYDROCHLORIDE 20 MG/ML
INJECTION, SOLUTION INFILTRATION; PERINEURAL AS NEEDED
Status: DISCONTINUED | OUTPATIENT
Start: 2025-05-21 | End: 2025-05-21 | Stop reason: SURG

## 2025-05-21 RX ORDER — PROPOFOL 10 MG/ML
VIAL (ML) INTRAVENOUS AS NEEDED
Status: DISCONTINUED | OUTPATIENT
Start: 2025-05-21 | End: 2025-05-21 | Stop reason: SURG

## 2025-05-21 RX ORDER — SODIUM CHLORIDE, SODIUM LACTATE, POTASSIUM CHLORIDE, CALCIUM CHLORIDE 600; 310; 30; 20 MG/100ML; MG/100ML; MG/100ML; MG/100ML
1000 INJECTION, SOLUTION INTRAVENOUS CONTINUOUS
Status: DISCONTINUED | OUTPATIENT
Start: 2025-05-21 | End: 2025-05-21 | Stop reason: HOSPADM

## 2025-05-21 RX ORDER — SODIUM CHLORIDE 0.9 % (FLUSH) 0.9 %
10 SYRINGE (ML) INJECTION AS NEEDED
Status: DISCONTINUED | OUTPATIENT
Start: 2025-05-21 | End: 2025-05-21 | Stop reason: HOSPADM

## 2025-05-21 RX ADMIN — PROPOFOL 200 MCG/KG/MIN: 10 INJECTION, EMULSION INTRAVENOUS at 09:49

## 2025-05-21 RX ADMIN — PROPOFOL 140 MG: 10 INJECTION, EMULSION INTRAVENOUS at 09:48

## 2025-05-21 RX ADMIN — LIDOCAINE HYDROCHLORIDE 60 MG: 20 INJECTION, SOLUTION INFILTRATION; PERINEURAL at 09:50

## 2025-05-21 RX ADMIN — GLYCOPYRROLATE 0.2 MG: 0.2 INJECTION INTRAMUSCULAR; INTRAVENOUS at 09:53

## 2025-05-21 RX ADMIN — SODIUM CHLORIDE, SODIUM LACTATE, POTASSIUM CHLORIDE, AND CALCIUM CHLORIDE 1000 ML: 600; 310; 30; 20 INJECTION, SOLUTION INTRAVENOUS at 09:03

## 2025-05-21 NOTE — ANESTHESIA POSTPROCEDURE EVALUATION
Patient: Cruz Lo    Procedure Summary       Date: 05/21/25 Room / Location: [facility] ENDOSCOPY 1 / [facility] ENDOSCOPY    Anesthesia Start: 0943 Anesthesia Stop: 1009    Procedures:       COLONOSCOPY TO CECUM      ESOPHAGOGASTRODUODENOSCOPY (Esophagus) Diagnosis:       Anemia, unspecified type      Black stool      Loss of appetite      History of colon polyps      (Anemia, unspecified type [D64.9])      (Black stool [K92.1])      (Loss of appetite [R63.0])      (History of colon polyps [Z86.0100])    Surgeons: Amadeo Dobson MD Provider: Romaine Campbell MD    Anesthesia Type: MAC ASA Status: 3            Anesthesia Type: MAC    Vitals  Vitals Value Taken Time   /85 05/21/25 10:31   Temp     Pulse 76 05/21/25 10:34   Resp 16 05/21/25 10:25   SpO2 98 % 05/21/25 10:34   Vitals shown include unfiled device data.        Post Anesthesia Care and Evaluation    No anesthesia care post op

## 2025-05-21 NOTE — ANESTHESIA PREPROCEDURE EVALUATION
Anesthesia Evaluation     Patient summary reviewed and Nursing notes reviewed                Airway   Mallampati: II  Dental      Pulmonary    (+) a smoker Current,  Cardiovascular     Rhythm: regular  Rate: normal    (+) hypertension, hyperlipidemia      Neuro/Psych  (+) psychiatric history Anxiety and Depression  GI/Hepatic/Renal/Endo    (+) obesity, renal disease- CRI    Musculoskeletal (-) negative ROS    Abdominal    Substance History   (+) alcohol use     OB/GYN negative ob/gyn ROS         Other                    Anesthesia Plan    ASA 3     MAC     intravenous induction     Anesthetic plan, risks, benefits, and alternatives have been provided, discussed and informed consent has been obtained with: patient.    CODE STATUS:

## 2025-05-21 NOTE — DISCHARGE INSTRUCTIONS
For the next 24 hours patient needs to be with a responsible adult.    For 24 hours DO NOT drive, operate machinery, appliances, drink alcohol, make important decisions or sign legal documents.    Start with a light or bland diet and advance to regular diet as tolerated.    Follow recommendations on procedure report provided by your doctor.    Call Dr Dobson for problems [phone]    Problems may include but not limited to: large amounts of bleeding, trouble breathing, repeated vomiting, severe unrelieved pain, fever or chills.

## 2025-05-29 ENCOUNTER — HOSPITAL ENCOUNTER (OUTPATIENT)
Facility: HOSPITAL | Age: 60
Discharge: HOME | End: 2025-05-29
Admitting: SURGERY
Payer: COMMERCIAL

## 2025-05-29 DIAGNOSIS — I1A.0 RESISTANT HYPERTENSION: ICD-10-CM

## 2025-05-29 PROCEDURE — 74150 CT ABDOMEN W/O CONTRAST: CPT

## 2025-06-05 ENCOUNTER — RESULTS FOLLOW-UP (OUTPATIENT)
Dept: SURGERY | Facility: CLINIC | Age: 60
End: 2025-06-05
Payer: COMMERCIAL

## 2025-06-25 ENCOUNTER — TELEPHONE (OUTPATIENT)
Dept: SPORTS MEDICINE | Facility: CLINIC | Age: 60
End: 2025-06-25
Payer: COMMERCIAL

## 2025-06-25 NOTE — TELEPHONE ENCOUNTER
Caller: KAYLA    Relationship to patient: SELF    Best call back number: [phone]    Type of visit: BILATERAL SHOULDER PAIN- LAST CORTISONE INJECTION 4/21/25- WANTS TO BE SCHEDULED FOR INJECTIONS- ADVISED IT HAS TO BE 3 MTHS- HE DOESN'T WANT TO WAIT- CAN HE JUST HAVE THEM AND PAY CASH- HE CAN BE THERE IN 20 MIN IF AVAILABLE- PLEASE CALL

## 2025-06-25 NOTE — TELEPHONE ENCOUNTER
Already called and spoke with patient, declined sooner follow up as he not sure anything other than injections will help. Patient scheduled for July for injection per his request.

## 2025-07-08 RX ORDER — ATORVASTATIN CALCIUM 10 MG/1
10 TABLET, FILM COATED ORAL DAILY
Qty: 90 TABLET | Refills: 3 | Status: SHIPPED | OUTPATIENT
Start: 2025-07-08

## 2025-07-08 NOTE — TELEPHONE ENCOUNTER
"Caller: Cruz Lo W \"DON\"    Relationship: Self    Best call back number: [phone]     Requested Prescriptions:   Requested Prescriptions     Pending Prescriptions Disp Refills    atorvastatin (LIPITOR) 10 MG tablet 90 tablet 3     Sig: Take 1 tablet by mouth Daily.        Pharmacy where request should be sent: [address] [phone] [facility] [phone]      Last office visit with prescribing clinician: 4/18/2025   Last telemedicine visit with prescribing clinician: Visit date not found   Next office visit with prescribing clinician: 10/24/2025     Additional details provided by patient:     Does the patient have less than a 3 day supply:  [] Yes  [x] No    Would you like a call back once the refill request has been completed: [] Yes [x] No    If the office needs to give you a call back, can they leave a voicemail: [x] Yes [] No    Gwendolyn Hare   07/08/25 09:21 EDT         "

## 2025-07-21 ENCOUNTER — OFFICE VISIT (OUTPATIENT)
Dept: SPORTS MEDICINE | Facility: CLINIC | Age: 60
End: 2025-07-21
Payer: COMMERCIAL

## 2025-07-21 VITALS
HEIGHT: 66 IN | RESPIRATION RATE: 16 BRPM | BODY MASS INDEX: 29.89 KG/M2 | SYSTOLIC BLOOD PRESSURE: 118 MMHG | WEIGHT: 186 LBS | OXYGEN SATURATION: 99 % | HEART RATE: 66 BPM | DIASTOLIC BLOOD PRESSURE: 60 MMHG

## 2025-07-21 DIAGNOSIS — M25.511 CHRONIC PAIN OF BOTH SHOULDERS: Primary | ICD-10-CM

## 2025-07-21 DIAGNOSIS — I12.9 BENIGN HYPERTENSION WITH CKD (CHRONIC KIDNEY DISEASE) STAGE III: ICD-10-CM

## 2025-07-21 DIAGNOSIS — M25.512 CHRONIC PAIN OF BOTH SHOULDERS: Primary | ICD-10-CM

## 2025-07-21 DIAGNOSIS — M75.52 SUBACROMIAL BURSITIS OF BOTH SHOULDERS: ICD-10-CM

## 2025-07-21 DIAGNOSIS — G89.29 CHRONIC PAIN OF BOTH SHOULDERS: Primary | ICD-10-CM

## 2025-07-21 DIAGNOSIS — N18.30 BENIGN HYPERTENSION WITH CKD (CHRONIC KIDNEY DISEASE) STAGE III: ICD-10-CM

## 2025-07-21 DIAGNOSIS — M75.51 SUBACROMIAL BURSITIS OF BOTH SHOULDERS: ICD-10-CM

## 2025-07-21 PROCEDURE — 20610 DRAIN/INJ JOINT/BURSA W/O US: CPT | Performed by: FAMILY MEDICINE

## 2025-07-21 PROCEDURE — 99213 OFFICE O/P EST LOW 20 MIN: CPT | Performed by: FAMILY MEDICINE

## 2025-07-21 RX ORDER — TRIAMCINOLONE ACETONIDE 40 MG/ML
40 INJECTION, SUSPENSION INTRA-ARTICULAR; INTRAMUSCULAR
Status: COMPLETED | OUTPATIENT
Start: 2025-07-21 | End: 2025-07-21

## 2025-07-21 RX ADMIN — TRIAMCINOLONE ACETONIDE 40 MG: 40 INJECTION, SUSPENSION INTRA-ARTICULAR; INTRAMUSCULAR at 10:48

## 2025-07-21 NOTE — PROGRESS NOTES
"Cruz is a 60 y.o. year old male presents to [facility]    Chief Complaint   Patient presents with    Shoulder Pain     F/u eval for b/l shoulder pain with cervicalgia, reports he would like to have injection for bursitis  - here for consideration of b/l shoulder steroid injections        History of Present Illness  History of Present Illness  The patient is a 60-year-old male with bilateral shoulder pain.    Bilateral Shoulder Pain  - Injections provided 2 months of relief, but pain returned.  - Nephrologist advised avoiding Aleve and Advil, using Tylenol instead, which provides temporary relief.  - Blood pressure is higher in the morning after painful nights.  - Advil at night is more effective than Tylenol.  - Exercises include shoulder exercises, treadmill, and small weights, but inconsistent due to pain.  - Right shoulder is more painful than the left.  - Inconsistent with physical therapy exercises.    I have reviewed the patient's medical, family, and social history in detail and updated the computerized patient record.    /60 (BP Location: Left arm, Patient Position: Sitting, Cuff Size: Adult)   Pulse 66   Resp 16   Ht 168.9 cm (66.5\")   Wt 84.4 kg (186 lb)   SpO2 99%   BMI 29.57 kg/m²      Physical Exam    Vital signs reviewed.   General: No acute distress.  Eyes: conjunctiva clear; pupils equally round and reactive  ENT: external ears atraumatic  CV: no peripheral edema  Resp: normal respiratory effort, no use of accessory muscles  Skin: no rashes or wounds; normal turgor  Psych: mood and affect appropriate; recent and remote memory intact  Neuro: sensation to light touch intact    MSK Exam  Physical Exam  Musculoskeletal: Bilateral shoulders - Negative drop arm, full ROM, negative Neer, Sims, belly press, empty can.      XR Shoulder 2+ View Bilateral (01/09/2025 09:44)     CMP          9/13/2024    11:27 4/18/2025    14:58   CMP   Glucose 114  92    BUN 21 "  33    Creatinine 1.55  2.33    EGFR 50.9  31    Sodium 139  145    Potassium 5.1  5.6    Chloride 105  112    Calcium 9.8  9.1    Total Protein 6.2  6.2    Albumin 4.2  4.2    Globulin 2.0  2.0    Total Bilirubin 0.3  <0.2    Alkaline Phosphatase 68  68    AST (SGOT) 21  14    ALT (SGPT) 22  14    Albumin/Globulin Ratio 2.1     BUN/Creatinine Ratio 13.5  14        Large Joint Arthrocentesis: L subacromial bursa  Date/Time: 7/21/2025 10:48 AM  Consent given by: patient  Site marked: site marked  Timeout: Immediately prior to procedure a time out was called to verify the correct patient, procedure, equipment, support staff and site/side marked as required   Supporting Documentation  Indications: pain   Procedure Details  Location: shoulder - L subacromial bursa  Needle size: 25 G  Approach: posterior  Medications administered: 40 mg triamcinolone acetonide 40 MG/ML  Patient tolerance: patient tolerated the procedure well with no immediate complications      Large Joint Arthrocentesis: R subacromial bursa  Date/Time: 7/21/2025 10:48 AM  Consent given by: patient  Site marked: site marked  Timeout: Immediately prior to procedure a time out was called to verify the correct patient, procedure, equipment, support staff and site/side marked as required   Supporting Documentation  Indications: pain   Procedure Details  Location: shoulder - R subacromial bursa  Needle size: 25 G  Approach: posterior  Medications administered: 40 mg triamcinolone acetonide 40 MG/ML  Patient tolerance: patient tolerated the procedure well with no immediate complications          Diagnoses and all orders for this visit:    1. Chronic pain of both shoulders (Primary)  -     MRI Shoulder Left Without Contrast; Future  -     MRI Shoulder Right Without Contrast; Future    2. Subacromial bursitis of both shoulders  -     MRI Shoulder Left Without Contrast; Future  -     MRI Shoulder Right Without Contrast; Future  -     Large Joint Arthrocentesis: L  subacromial bursa  -     Large Joint Arthrocentesis: R subacromial bursa    3. Benign hypertension with CKD (chronic kidney disease) stage III        Assessment & Plan  1-2. Bilateral shoulder pain: Chronic.  - Possible bursitis or rotator cuff pathology (tendinopathy, tendinitis, partial tear).  - Pain relief from previous cortisone injection suggests rotator cuff involvement.  - MRI recommended for diagnosis; x-rays insufficient for rotator cuff issues.  - Administered injections today for immediate relief.  - MRIs of both shoulders to be scheduled.  - Advised to continue rotator cuff exercises.    3.  CKD 3.  Avoid regular use of NSAIDs.    Follow-up after MRIs.    PROCEDURE  Bilateral shoulder injections administered today.          Patient or patient representative verbalized consent for the use of Ambient Listening during the visit with  FRANCINE Jewell Jr.,  for chart documentation on 07/21/2025 at 10:43 EDT.

## 2025-07-31 ENCOUNTER — HOSPITAL ENCOUNTER (OUTPATIENT)
Dept: CT IMAGING | Facility: HOSPITAL | Age: 60
Discharge: HOME | End: 2025-07-31

## 2025-07-31 ENCOUNTER — HOSPITAL ENCOUNTER (OUTPATIENT)
Dept: CARDIOLOGY | Facility: HOSPITAL | Age: 60
Discharge: HOME | End: 2025-07-31

## 2025-07-31 DIAGNOSIS — Z13.6 ENCOUNTER FOR SCREENING FOR CORONARY ARTERY DISEASE: ICD-10-CM

## 2025-07-31 DIAGNOSIS — Z13.6 ENCOUNTER FOR SCREENING FOR VASCULAR DISEASE: ICD-10-CM

## 2025-07-31 LAB
BH CV VAS SCREENING CAROTID CCA LEFT: 100 CM/SEC
BH CV VAS SCREENING CAROTID CCA RIGHT: 86 CM/SEC
BH CV VAS SCREENING CAROTID ICA LEFT: 70 CM/SEC
BH CV VAS SCREENING CAROTID ICA RIGHT: 102 CM/SEC
BH CV XLRA MEAS - MID AO DIAM: 1.7 CM
BH CV XLRA MEAS - PAD LEFT ABI PT: 1.37
BH CV XLRA MEAS - PAD LEFT ARM: 168 MMHG
BH CV XLRA MEAS - PAD LEFT LEG PT: 230 MMHG
BH CV XLRA MEAS - PAD RIGHT ABI PT: 1.36
BH CV XLRA MEAS - PAD RIGHT ARM: 152 MMHG
BH CV XLRA MEAS - PAD RIGHT LEG PT: 229 MMHG
BH CV XLRA MEAS LEFT DIST CCA EDV: -18 CM/SEC
BH CV XLRA MEAS LEFT DIST CCA PSV: -100 CM/SEC
BH CV XLRA MEAS LEFT ICA/CCA RATIO: 0.7
BH CV XLRA MEAS LEFT PROX ICA EDV: -17.6 CM/SEC
BH CV XLRA MEAS LEFT PROX ICA PSV: -70.2 CM/SEC
BH CV XLRA MEAS RIGHT DIST CCA EDV: 12.4 CM/SEC
BH CV XLRA MEAS RIGHT DIST CCA PSV: 86.4 CM/SEC
BH CV XLRA MEAS RIGHT ICA/CCA RATIO: 1.2
BH CV XLRA MEAS RIGHT PROX ICA EDV: -17.4 CM/SEC
BH CV XLRA MEAS RIGHT PROX ICA PSV: -102 CM/SEC

## 2025-07-31 PROCEDURE — VASCULARSCN2 VASCULAR SCREENING (BUNDLE) CAR: Performed by: INTERNAL MEDICINE

## 2025-07-31 PROCEDURE — 93799 UNLISTED CV SVC/PROCEDURE: CPT

## 2025-07-31 PROCEDURE — 75571 CT HRT W/O DYE W/CA TEST: CPT

## 2025-08-05 ENCOUNTER — TELEPHONE (OUTPATIENT)
Dept: SPORTS MEDICINE | Facility: CLINIC | Age: 60
End: 2025-08-05
Payer: COMMERCIAL

## 2025-08-07 ENCOUNTER — PROCEDURE VISIT (OUTPATIENT)
Dept: SPORTS MEDICINE | Facility: CLINIC | Age: 60
End: 2025-08-07

## 2025-08-07 ENCOUNTER — OFFICE VISIT (OUTPATIENT)
Dept: SPORTS MEDICINE | Facility: CLINIC | Age: 60
End: 2025-08-07
Payer: COMMERCIAL

## 2025-08-07 VITALS
BODY MASS INDEX: 29.89 KG/M2 | DIASTOLIC BLOOD PRESSURE: 60 MMHG | OXYGEN SATURATION: 99 % | RESPIRATION RATE: 16 BRPM | WEIGHT: 186 LBS | HEIGHT: 66 IN | SYSTOLIC BLOOD PRESSURE: 128 MMHG | HEART RATE: 63 BPM

## 2025-08-07 VITALS
DIASTOLIC BLOOD PRESSURE: 60 MMHG | SYSTOLIC BLOOD PRESSURE: 128 MMHG | BODY MASS INDEX: 29.89 KG/M2 | WEIGHT: 186 LBS | RESPIRATION RATE: 16 BRPM | OXYGEN SATURATION: 99 % | HEART RATE: 63 BPM | HEIGHT: 66 IN

## 2025-08-07 DIAGNOSIS — G89.29 CHRONIC PAIN OF BOTH SHOULDERS: Primary | ICD-10-CM

## 2025-08-07 DIAGNOSIS — M25.511 CHRONIC PAIN OF BOTH SHOULDERS: Primary | ICD-10-CM

## 2025-08-07 DIAGNOSIS — M67.919 TENDINOPATHY OF ROTATOR CUFF, UNSPECIFIED LATERALITY: Primary | ICD-10-CM

## 2025-08-07 DIAGNOSIS — M25.512 CHRONIC PAIN OF BOTH SHOULDERS: Primary | ICD-10-CM

## 2025-08-07 DIAGNOSIS — M67.919 TENDINOPATHY OF ROTATOR CUFF, UNSPECIFIED LATERALITY: ICD-10-CM

## 2025-08-07 PROCEDURE — PRPANGEL: Performed by: FAMILY MEDICINE

## 2025-08-07 PROCEDURE — 99213 OFFICE O/P EST LOW 20 MIN: CPT | Performed by: FAMILY MEDICINE

## 2025-08-22 RX ORDER — CICLOPIROX 80 MG/ML
SOLUTION TOPICAL NIGHTLY
Qty: 18 ML | Refills: 1 | Status: SHIPPED | OUTPATIENT
Start: 2025-08-22

## 2025-08-28 ENCOUNTER — OFFICE VISIT (OUTPATIENT)
Age: 60
End: 2025-08-28
Payer: COMMERCIAL

## 2025-08-28 VITALS
DIASTOLIC BLOOD PRESSURE: 80 MMHG | HEIGHT: 67 IN | HEART RATE: 57 BPM | WEIGHT: 183.4 LBS | OXYGEN SATURATION: 99 % | BODY MASS INDEX: 28.79 KG/M2 | SYSTOLIC BLOOD PRESSURE: 144 MMHG

## 2025-08-28 DIAGNOSIS — E78.2 MIXED HYPERLIPIDEMIA: ICD-10-CM

## 2025-08-28 DIAGNOSIS — I12.9 BENIGN HYPERTENSION WITH CKD (CHRONIC KIDNEY DISEASE) STAGE III: ICD-10-CM

## 2025-08-28 DIAGNOSIS — N18.30 BENIGN HYPERTENSION WITH CKD (CHRONIC KIDNEY DISEASE) STAGE III: ICD-10-CM

## 2025-08-28 DIAGNOSIS — I25.10 CORONARY ARTERY CALCIFICATION SEEN ON CT SCAN: Primary | ICD-10-CM

## 2025-08-28 RX ORDER — ASPIRIN 81 MG/1
81 TABLET ORAL DAILY
COMMUNITY

## 2025-08-28 RX ORDER — LOSARTAN POTASSIUM AND HYDROCHLOROTHIAZIDE 12.5; 5 MG/1; MG/1
1 TABLET ORAL DAILY
COMMUNITY
Start: 2025-08-14 | End: 2026-08-14

## (undated) DEVICE — BLCK/BITE BLOX W/DENTL/RIM W/STRAP 54F

## (undated) DEVICE — MSK PROC CURAPLEX O2 2/ADAPT 7FT

## (undated) DEVICE — CANN O2 ETCO2 FITS ALL CONN CO2 SMPL A/ 7IN DISP LF

## (undated) DEVICE — LN SMPL CO2 SHTRM SD STREAM W/M LUER

## (undated) DEVICE — TUBING, SUCTION, 1/4" X 10', STRAIGHT: Brand: MEDLINE

## (undated) DEVICE — SENSR O2 OXIMAX FNGR A/ 18IN NONSTR

## (undated) DEVICE — ADAPT CLN BIOGUARD AIR/H2O DISP

## (undated) DEVICE — KT ORCA ORCAPOD DISP STRL